# Patient Record
Sex: FEMALE | Race: WHITE | NOT HISPANIC OR LATINO | Employment: OTHER | URBAN - METROPOLITAN AREA
[De-identification: names, ages, dates, MRNs, and addresses within clinical notes are randomized per-mention and may not be internally consistent; named-entity substitution may affect disease eponyms.]

---

## 2017-01-03 RX ORDER — EZETIMIBE/SIMVASTATIN 10 MG-20MG
TABLET ORAL
Qty: 90 TABLET | Refills: 0 | Status: SHIPPED | OUTPATIENT
Start: 2017-01-03 | End: 2017-04-04 | Stop reason: SDUPTHER

## 2017-01-12 ENCOUNTER — OFFICE VISIT (OUTPATIENT)
Dept: GASTROENTEROLOGY | Facility: CLINIC | Age: 77
End: 2017-01-12

## 2017-01-12 VITALS
HEIGHT: 63 IN | DIASTOLIC BLOOD PRESSURE: 78 MMHG | WEIGHT: 167.4 LBS | SYSTOLIC BLOOD PRESSURE: 126 MMHG | BODY MASS INDEX: 29.66 KG/M2

## 2017-01-12 DIAGNOSIS — D50.8 OTHER IRON DEFICIENCY ANEMIA: ICD-10-CM

## 2017-01-12 DIAGNOSIS — D64.9 ANEMIA, UNSPECIFIED TYPE: Primary | ICD-10-CM

## 2017-01-12 PROCEDURE — 99203 OFFICE O/P NEW LOW 30 MIN: CPT | Performed by: INTERNAL MEDICINE

## 2017-01-12 NOTE — LETTER
January 12, 2017     Pipo Guillermo DO  [address] [facility] [address]    Patient: Rut Wright   YOB: 1940   Date of Visit: 1/12/2017       Dear Dr. Eagle DO:    Thank you for referring Rut Wright to me for evaluation. Below are the relevant portions of my assessment and plan of care.                   If you have questions, please do not hesitate to call me. I look forward to following Rut along with you.         Sincerely,        Araceli Miranda MD        CC: No Recipients

## 2017-01-12 NOTE — MR AVS SNAPSHOT
[facility] GASTROENTEROLOGY  [phone]                    Rut Wright   1/12/2017 10:45 AM   Office Visit    Dept Phone:  [phone]   Encounter #:  [id number]    Provider:  Araceli Miranda MD   Department:  [facility] GASTROENTEROLOGY                Your Full Care Plan              Today's Medication Changes          These changes are accurate as of: 1/12/17 11:24 AM.  If you have any questions, ask your nurse or doctor.               New Medication(s)Ordered:     polyethylene glycol 236 G solution   Commonly known as:  GoLYTELY   Drink 1/2 starting at 2:00pm the day prior. Drink remaining 1/2 at 10:00 pm. May add flavor packet.   Started by:  Araceli Miranda MD            Where to Get Your Medications      These medications were sent to [facility] [address] - [address] [phone] [facility] [phone]   [address] [address]     Phone:  [phone]     polyethylene glycol 236 G solution                  Your Updated Medication List          This list is accurate as of: 1/12/17 11:24 AM.  Always use your most recent med list.                Chlorcyclizine-Pseudoephed 25-60 MG tablet   Commonly known as:  STAHIST AD   1 po q 8 hours prn       coenzyme Q10 50 MG capsule capsule       etodolac 400 MG tablet   Commonly known as:  LODINE   TAKE ONE TABLET BY MOUTH DAILY AS NEEDED FOR PAIN       ferrous sulfate 325 (65 FE) MG tablet       fluticasone 50 MCG/ACT nasal spray   Commonly known as:  FLONASE       montelukast 10 MG tablet   Commonly known as:  SINGULAIR   TAKE ONE TABLET BY MOUTH EVERY NIGHT AT BEDTIME       MULTI VITAMIN DAILY PO       polyethylene glycol 236 G solution   Commonly known as:  GoLYTELY   Drink 1/2 starting at 2:00pm the day prior. Drink remaining 1/2 at 10:00 pm. May add flavor packet.       PROAIR  (90 BASE) MCG/ACT inhaler   Generic drug:  albuterol       venlafaxine 75 MG tablet   Commonly known as:   "EFFEXOR   Take 1 tablet by mouth daily.       vitamin D 18108 UNITS capsule capsule   Commonly known as:  ERGOCALCIFEROL       VYTORIN 10-20 MG per tablet   Generic drug:  ezetimibe-simvastatin   TAKE ONE TABLET BY MOUTH ONCE NIGHTLY               We Performed the Following     Case Request       You Were Diagnosed With        Codes Comments    Anemia, unspecified type    -  Primary ICD-10-CM: D64.9  ICD-9-CM: 285.9     Other iron deficiency anemia     ICD-10-CM: D50.8       Instructions     None    Patient Instructions History      Upcoming Appointments     Visit Type Date Time Department    NEW PATIENT 1/12/2017 10:45 AM [facility]      MyChart Signup     Our records indicate that you have declined [employer] signup. If you would like to sign up for [employer], please email [email or web address] or call [phone] to obtain an activation code.             Other Info from Your Visit           Allergies     Penicillins  Hives      Reason for Visit     Anemia           Vital Signs     Blood Pressure Height Weight Body Mass Index Smoking Status       126/78 62.5\" (158.8 cm) 167 lb 6.4 oz (75.9 kg) 30.13 kg/m2 Never Smoker       Problems and Diagnoses Noted     Anemia    -  Primary    Iron deficiency anemia            "

## 2017-01-12 NOTE — PROGRESS NOTES
"    PATIENT INFORMATION  Rut Wright       - 1940    CHIEF COMPLAINT  Chief Complaint   Patient presents with   • Anemia       HISTORY OF PRESENT ILLNESS  HPI    78 yo diagnosed with iron deficiency last month. She had labs at pcp for routine labs. Hemoglobin was noted to be 10 with iron of 15 and ferritin of 9.8. Hemoglobin in  was 13.9. She has been having issues with fatigue since 2016. Moderate in intensity. No blood in stool or melena. No weight loss. Last cls was 5-6 years ago and done in [place] for screening purposes. Normal per patient.  She routinely donates blood up until September last  when they denied her due to anemia.   She was donating twice yearly.  No family history. No heartburn or dysphagia or odynophagia. Weight has been stable. She occ. Has epigastric abdominal pain, sharp, brief, last only seconds and makes a \"quesy\" sensation. No radiation of the pain. This occurs 2-3 times weekly.  She takes etodolac daily for the past 6 years.  Iron started 2 weeks ago.  REVIEW OF SYSTEMS  Review of Systems   HENT: Positive for sinus pressure and voice change.    Genitourinary: Positive for frequency.   Neurological: Positive for headaches.   All other systems reviewed and are negative.        ACTIVE PROBLEMS  Patient Active Problem List    Diagnosis   • Hyperlipidemia [E78.5]   • Perennial allergic rhinitis [J30.89]   • Vitamin D deficiency [E55.9]   • Mixed anxiety depressive disorder [F41.8]   • Generalized osteoarthritis [M15.9]         PAST MEDICAL HISTORY  Past Medical History   Diagnosis Date   • Allergic    • Depression    • Hyperlipidemia          SURGICAL HISTORY  Past Surgical History   Procedure Laterality Date   • Tonsillectomy     • Hysterectomy           FAMILY HISTORY  Family History   Problem Relation Age of Onset   • Heart disease Mother    • Heart disease Father          SOCIAL HISTORY  Social History     Occupational History   • Not on file.     Social " "History Main Topics   • Smoking status: Never Smoker   • Smokeless tobacco: Not on file   • Alcohol use No   • Drug use: Not on file   • Sexual activity: Not on file         CURRENT MEDICATIONS    Current Outpatient Prescriptions:   •  albuterol (PROAIR HFA) 108 (90 BASE) MCG/ACT inhaler, ProAir  (90 Base) MCG/ACT Inhalation Aerosol Solution; Patient Sig: ProAir  (90 Base) MCG/ACT Inhalation Aerosol Solution INHALE TWO PUFFS BY MOUTH EVERY 4 HOURS AS NEEDED; 1; 2; 04-Jan-2016; Active, Disp: , Rfl:   •  Chlorcyclizine-Pseudoephed (STAHIST AD) 25-60 MG tablet, 1 po q 8 hours prn, Disp: 30 tablet, Rfl: 0  •  coenzyme Q10 50 MG capsule capsule, Take  by mouth Daily., Disp: , Rfl:   •  etodolac (LODINE) 400 MG tablet, TAKE ONE TABLET BY MOUTH DAILY AS NEEDED FOR PAIN, Disp: 90 tablet, Rfl: 0  •  ferrous sulfate 325 (65 FE) MG tablet, Take 325 mg by mouth Daily With Breakfast., Disp: , Rfl:   •  fluticasone (FLONASE) 50 MCG/ACT nasal spray, into each nostril., Disp: , Rfl:   •  montelukast (SINGULAIR) 10 MG tablet, TAKE ONE TABLET BY MOUTH EVERY NIGHT AT BEDTIME, Disp: 30 tablet, Rfl: 2  •  Multiple Vitamin (MULTI VITAMIN DAILY PO), Take  by mouth., Disp: , Rfl:   •  venlafaxine (EFFEXOR) 75 MG tablet, Take 1 tablet by mouth daily., Disp: 90 tablet, Rfl: 1  •  vitamin D (ERGOCALCIFEROL) 96808 UNITS capsule capsule, Take 50,000 Units by mouth 1 (One) Time Per Week., Disp: , Rfl:   •  VYTORIN 10-20 MG per tablet, TAKE ONE TABLET BY MOUTH ONCE NIGHTLY, Disp: 90 tablet, Rfl: 0    ALLERGIES  Penicillins    VITALS  Vitals:    01/12/17 1045   BP: 126/78   Weight: 167 lb 6.4 oz (75.9 kg)   Height: 62.5\" (158.8 cm)       LAST RESULTS   Office Visit on 12/20/2016   Component Date Value Ref Range Status   • WBC 12/20/2016 8.83  4.80 - 10.80 10*3/mm3 Final   • RBC 12/20/2016 4.62  4.20 - 5.40 10*6/mm3 Final   • Hemoglobin 12/20/2016 10.0* 12.0 - 16.0 g/dL Final   • Hematocrit 12/20/2016 32.8* 37.0 - 47.0 % Final   • MCV " 12/20/2016 71.0* 81.0 - 99.0 fL Final   • MCH 12/20/2016 21.6* 27.0 - 31.0 pg Final   • MCHC 12/20/2016 30.5* 31.0 - 37.0 g/dL Final   • RDW 12/20/2016 17.2* 11.5 - 14.5 % Final   • Platelets 12/20/2016 410  140 - 500 10*3/mm3 Final   • Neutrophil Rel % 12/20/2016 61.9  45.0 - 70.0 % Final   • Lymphocyte Rel % 12/20/2016 26.7  20.0 - 45.0 % Final   • Monocyte Rel % 12/20/2016 7.4  3.0 - 8.0 % Final   • Eosinophil Rel % 12/20/2016 3.3  0.0 - 4.0 % Final   • Basophil Rel % 12/20/2016 0.5  0.0 - 2.0 % Final   • Neutrophils Absolute 12/20/2016 5.47  1.50 - 8.30 10*3/mm3 Final   • Lymphocytes Absolute 12/20/2016 2.36  0.60 - 4.80 10*3/mm3 Final   • Monocytes Absolute 12/20/2016 0.65  0.00 - 1.00 10*3/mm3 Final   • Eosinophils Absolute 12/20/2016 0.29  0.10 - 0.30 10*3/mm3 Final   • Basophils Absolute 12/20/2016 0.04  0.00 - 0.20 10*3/mm3 Final   • Immature Granulocyte Rel % 12/20/2016 0.2  0.0 - 0.5 % Final   • Immature Grans Absolute 12/20/2016 0.02  0.00 - 0.03 10*3/mm3 Final   • nRBC 12/20/2016 0.0  0.0 - 0.0 /100 WBC Final   • Glucose 12/20/2016 96  65 - 99 mg/dL Final   • BUN 12/20/2016 13  8 - 23 mg/dL Final   • Creatinine 12/20/2016 0.95  0.57 - 1.00 mg/dL Final   • eGFR Non African Am 12/20/2016 57* >60 mL/min/1.73 Final    Comment: The MDRD GFR formula is only valid for adults with stable  renal function between ages 18 and 70.     • eGFR  Am 12/20/2016 70  >60 mL/min/1.73 Final   • BUN/Creatinine Ratio 12/20/2016 13.7  7.0 - 25.0 Final   • Sodium 12/20/2016 136  136 - 145 mmol/L Final   • Potassium 12/20/2016 4.8  3.5 - 5.2 mmol/L Final   • Chloride 12/20/2016 96* 98 - 107 mmol/L Final   • Total CO2 12/20/2016 24.9  22.0 - 29.0 mmol/L Final   • Calcium 12/20/2016 9.7  8.8 - 10.5 mg/dL Final   • Total Protein 12/20/2016 6.7  6.0 - 8.5 g/dL Final   • Albumin 12/20/2016 4.40  3.50 - 5.20 g/dL Final   • Globulin 12/20/2016 2.3  gm/dL Final   • A/G Ratio 12/20/2016 1.9  g/dL Final   • Total Bilirubin  12/20/2016 0.4  0.2 - 1.2 mg/dL Final   • Alkaline Phosphatase 12/20/2016 88  40 - 129 U/L Final   • AST (SGOT) 12/20/2016 21  5 - 32 U/L Final   • ALT (SGPT) 12/20/2016 20  5 - 33 U/L Final   • TSH 12/20/2016 2.440  0.270 - 4.200 mIU/mL Final   • TIBC 12/20/2016 424  261 - 478 mcg/dL Final   • UIBC 12/20/2016 409* 112 - 346 mcg/dL Final   • Iron 12/20/2016 15* 37 - 145 mcg/dL Final   • Iron Saturation 12/20/2016 4  % Final   • Ferritin 12/20/2016 9.86* 13.00 - 150.00 ng/mL Final   • Written Authorization 12/20/2016 Comment   Final    Comment: Written Authorization Received.  [facility]  Logged by Nida Blum       No results found.    PHYSICAL EXAM  Physical Exam   Constitutional: She is oriented to person, place, and time. She appears well-developed and well-nourished. No distress.   HENT:   Head: Normocephalic and atraumatic.   Mouth/Throat: Oropharynx is clear and moist.   Eyes: EOM are normal. Pupils are equal, round, and reactive to light.   Neck: Normal range of motion. No tracheal deviation present.   Cardiovascular: Normal rate, regular rhythm, normal heart sounds and intact distal pulses.  Exam reveals no gallop and no friction rub.    No murmur heard.  Pulmonary/Chest: Effort normal and breath sounds normal. No stridor. No respiratory distress. She has no wheezes. She has no rales. She exhibits no tenderness.   Abdominal: Soft. Bowel sounds are normal. She exhibits no distension. There is no tenderness. There is no rebound and no guarding.   Musculoskeletal: She exhibits no edema.   Lymphadenopathy:     She has no cervical adenopathy.   Neurological: She is alert and oriented to person, place, and time.   Skin: Skin is warm. She is not diaphoretic.   Psychiatric: She has a normal mood and affect. Her behavior is normal. Judgment and thought content normal.   Nursing note and vitals reviewed.      ASSESSMENT  Diagnoses and all orders for this visit:    Anemia,  unspecified type    Other iron deficiency anemia  -     Case Request; Standing  -     Case Request    Other orders  -     Implement Anesthesia orders day of procedure.; Standing  -     Obtain informed consent; Standing  -     Verify informed consent; Standing          PLAN  No Follow-up on file.      Risks, benefits and alternatives discussed including but not limited to the complications of bleeding, perforation and sedation related problems.      Stop etodolac

## 2017-01-16 RX ORDER — VENLAFAXINE 75 MG/1
TABLET ORAL
Qty: 90 TABLET | Refills: 0 | Status: SHIPPED | OUTPATIENT
Start: 2017-01-16 | End: 2017-04-18 | Stop reason: SDUPTHER

## 2017-01-19 ENCOUNTER — ANESTHESIA EVENT (OUTPATIENT)
Dept: PERIOP | Facility: HOSPITAL | Age: 77
End: 2017-01-19

## 2017-01-20 ENCOUNTER — HOSPITAL ENCOUNTER (OUTPATIENT)
Facility: HOSPITAL | Age: 77
Setting detail: HOSPITAL OUTPATIENT SURGERY
Discharge: HOME | End: 2017-01-20
Attending: INTERNAL MEDICINE | Admitting: INTERNAL MEDICINE

## 2017-01-20 ENCOUNTER — ANESTHESIA (OUTPATIENT)
Dept: PERIOP | Facility: HOSPITAL | Age: 77
End: 2017-01-20

## 2017-01-20 VITALS
OXYGEN SATURATION: 99 % | DIASTOLIC BLOOD PRESSURE: 64 MMHG | HEART RATE: 75 BPM | HEIGHT: 62 IN | TEMPERATURE: 98.1 F | SYSTOLIC BLOOD PRESSURE: 142 MMHG | RESPIRATION RATE: 16 BRPM

## 2017-01-20 DIAGNOSIS — D50.8 OTHER IRON DEFICIENCY ANEMIA: ICD-10-CM

## 2017-01-20 PROCEDURE — 25010000002 PROPOFOL 10 MG/ML EMULSION: Performed by: NURSE ANESTHETIST, CERTIFIED REGISTERED

## 2017-01-20 PROCEDURE — 43239 EGD BIOPSY SINGLE/MULTIPLE: CPT | Performed by: INTERNAL MEDICINE

## 2017-01-20 RX ORDER — SODIUM CHLORIDE 0.9 % (FLUSH) 0.9 %
1-10 SYRINGE (ML) INJECTION AS NEEDED
Status: DISCONTINUED | OUTPATIENT
Start: 2017-01-20 | End: 2017-01-20 | Stop reason: HOSPADM

## 2017-01-20 RX ORDER — SODIUM CHLORIDE, SODIUM LACTATE, POTASSIUM CHLORIDE, CALCIUM CHLORIDE 600; 310; 30; 20 MG/100ML; MG/100ML; MG/100ML; MG/100ML
9 INJECTION, SOLUTION INTRAVENOUS CONTINUOUS
Status: DISCONTINUED | OUTPATIENT
Start: 2017-01-20 | End: 2017-01-20 | Stop reason: HOSPADM

## 2017-01-20 RX ORDER — PROPOFOL 10 MG/ML
VIAL (ML) INTRAVENOUS CONTINUOUS PRN
Status: DISCONTINUED | OUTPATIENT
Start: 2017-01-20 | End: 2017-01-20 | Stop reason: SURG

## 2017-01-20 RX ORDER — LIDOCAINE HYDROCHLORIDE 10 MG/ML
0.3 INJECTION, SOLUTION EPIDURAL; INFILTRATION; INTRACAUDAL; PERINEURAL ONCE
Status: COMPLETED | OUTPATIENT
Start: 2017-01-20 | End: 2017-01-20

## 2017-01-20 RX ORDER — PROPOFOL 10 MG/ML
VIAL (ML) INTRAVENOUS AS NEEDED
Status: DISCONTINUED | OUTPATIENT
Start: 2017-01-20 | End: 2017-01-20 | Stop reason: SURG

## 2017-01-20 RX ORDER — GLYCOPYRROLATE 0.2 MG/ML
INJECTION INTRAMUSCULAR; INTRAVENOUS AS NEEDED
Status: DISCONTINUED | OUTPATIENT
Start: 2017-01-20 | End: 2017-01-20 | Stop reason: SURG

## 2017-01-20 RX ORDER — LIDOCAINE HYDROCHLORIDE 10 MG/ML
INJECTION, SOLUTION INFILTRATION; PERINEURAL AS NEEDED
Status: DISCONTINUED | OUTPATIENT
Start: 2017-01-20 | End: 2017-01-20 | Stop reason: SURG

## 2017-01-20 RX ADMIN — PROPOFOL 50 MG: 10 INJECTION, EMULSION INTRAVENOUS at 13:00

## 2017-01-20 RX ADMIN — PROPOFOL 100 MCG/KG/MIN: 10 INJECTION, EMULSION INTRAVENOUS at 13:00

## 2017-01-20 RX ADMIN — GLYCOPYRROLATE 0.1 MG: 0.2 INJECTION INTRAMUSCULAR; INTRAVENOUS at 12:55

## 2017-01-20 RX ADMIN — PROPOFOL 50 MG: 10 INJECTION, EMULSION INTRAVENOUS at 13:05

## 2017-01-20 RX ADMIN — LIDOCAINE HYDROCHLORIDE 50 MG: 10 INJECTION, SOLUTION INFILTRATION; PERINEURAL at 12:55

## 2017-01-20 RX ADMIN — PROPOFOL 30 MG: 10 INJECTION, EMULSION INTRAVENOUS at 13:10

## 2017-01-20 RX ADMIN — LIDOCAINE HYDROCHLORIDE 0.3 ML: 10 INJECTION, SOLUTION EPIDURAL; INFILTRATION; INTRACAUDAL; PERINEURAL at 12:20

## 2017-01-20 RX ADMIN — SODIUM CHLORIDE, POTASSIUM CHLORIDE, SODIUM LACTATE AND CALCIUM CHLORIDE: 600; 310; 30; 20 INJECTION, SOLUTION INTRAVENOUS at 12:55

## 2017-01-20 RX ADMIN — SODIUM CHLORIDE, POTASSIUM CHLORIDE, SODIUM LACTATE AND CALCIUM CHLORIDE 9 ML/HR: 600; 310; 30; 20 INJECTION, SOLUTION INTRAVENOUS at 12:20

## 2017-01-20 RX ADMIN — PROPOFOL 30 MG: 10 INJECTION, EMULSION INTRAVENOUS at 13:20

## 2017-01-20 NOTE — ANESTHESIA PREPROCEDURE EVALUATION
Anesthesia Evaluation     Patient summary reviewed and Nursing notes reviewed    No history of anesthetic complications   Airway   Mallampati: I  TM distance: >3 FB  Neck ROM: full  no difficulty expected  Dental    (+) upper dentures    Comment: Lower partial    Pulmonary - normal exam    breath sounds clear to auscultation    ROS comment: Allergies  Uses pro air inhaler as needed, no use for 2 mos  Cardiovascular - normal exam  Exercise tolerance: good (4-7 METS)  (+) hypertension (a little high blood pressure, no meds),     Rhythm: regular  Rate: normal    Neuro/Psych  (+) headaches (current), psychiatric history Anxiety and Depression,    Dizziness: 4 episodes last 15 years has felt nauseated and would pass out, resolves with lying down.  GI/Hepatic/Renal/Endo - negative ROS     Musculoskeletal     Abdominal  - normal exam   Substance History      OB/GYN negative ob/gyn ROS         Other   (+) blood dyscrasia (anemia, low iron, fatigue), arthritis (generalized osteoarthritis, hands)                          Anesthesia Plan    ASA 2     MAC     intravenous induction   Anesthetic plan and risks discussed with patient.

## 2017-01-20 NOTE — ANESTHESIA POSTPROCEDURE EVALUATION
Patient: Rut Wright    Procedure Summary     Date Anesthesia Start Anesthesia Stop Room / Location    01/20/17 1255 1341 [facility] / [facility] OR       Procedure Diagnosis Surgeon Provider    ESOPHAGOGASTRODUODENOSCOPY WITH BIOPSY (N/A Esophagus); COLONOSCOPY (N/A ) Other iron deficiency anemia  (Other iron deficiency anemia [D50.8]) MD Tod Blancas CRNA          Anesthesia Type: MAC  Last vitals  /67 (01/20/17 1354)    Temp 98.1 °F (36.7 °C) (01/20/17 1344)    Pulse 83 (01/20/17 1354)   Resp 16 (01/20/17 1354)    SpO2 99 % (01/20/17 1354)      Post Anesthesia Care and Evaluation    Patient location during evaluation: bedside  Patient participation: complete - patient participated  Level of consciousness: awake and alert  Pain score: 0  Pain management: adequate  Airway patency: patent  Anesthetic complications: No anesthetic complications    Cardiovascular status: acceptable  Respiratory status: acceptable  Hydration status: acceptable

## 2017-01-24 LAB
LAB AP CASE REPORT: NORMAL
Lab: NORMAL
PATH REPORT.FINAL DX SPEC: NORMAL

## 2017-02-14 RX ORDER — MONTELUKAST SODIUM 10 MG/1
TABLET ORAL
Qty: 30 TABLET | Refills: 1 | Status: SHIPPED | OUTPATIENT
Start: 2017-02-14 | End: 2017-04-22 | Stop reason: SDUPTHER

## 2017-03-20 RX ORDER — ETODOLAC 400 MG/1
TABLET, FILM COATED ORAL
Qty: 90 TABLET | Refills: 0 | Status: SHIPPED | OUTPATIENT
Start: 2017-03-20 | End: 2017-06-17 | Stop reason: SDUPTHER

## 2017-04-04 RX ORDER — EZETIMIBE/SIMVASTATIN 10 MG-20MG
TABLET ORAL
Qty: 90 TABLET | Refills: 0 | Status: SHIPPED | OUTPATIENT
Start: 2017-04-04 | End: 2017-07-01 | Stop reason: SDUPTHER

## 2017-04-14 RX ORDER — VENLAFAXINE 75 MG/1
TABLET ORAL
Qty: 90 TABLET | Refills: 0 | OUTPATIENT
Start: 2017-04-14

## 2017-04-18 RX ORDER — VENLAFAXINE 75 MG/1
75 TABLET ORAL DAILY
Qty: 30 TABLET | Refills: 0 | Status: SHIPPED | OUTPATIENT
Start: 2017-04-18 | End: 2017-05-17 | Stop reason: SDUPTHER

## 2017-04-24 RX ORDER — MONTELUKAST SODIUM 10 MG/1
TABLET ORAL
Qty: 30 TABLET | Refills: 0 | Status: SHIPPED | OUTPATIENT
Start: 2017-04-24 | End: 2017-05-23 | Stop reason: SDUPTHER

## 2017-05-10 ENCOUNTER — HOSPITAL ENCOUNTER (OUTPATIENT)
Dept: GENERAL RADIOLOGY | Facility: HOSPITAL | Age: 77
Discharge: HOME | End: 2017-05-10
Admitting: FAMILY MEDICINE

## 2017-05-10 ENCOUNTER — HOSPITAL ENCOUNTER (OUTPATIENT)
Dept: GENERAL RADIOLOGY | Facility: HOSPITAL | Age: 77
Discharge: HOME | End: 2017-05-10

## 2017-05-10 ENCOUNTER — OFFICE VISIT (OUTPATIENT)
Dept: FAMILY MEDICINE CLINIC | Facility: CLINIC | Age: 77
End: 2017-05-10

## 2017-05-10 ENCOUNTER — TRANSCRIBE ORDERS (OUTPATIENT)
Dept: FAMILY MEDICINE CLINIC | Facility: CLINIC | Age: 77
End: 2017-05-10

## 2017-05-10 VITALS
BODY MASS INDEX: 29.81 KG/M2 | TEMPERATURE: 98.2 F | OXYGEN SATURATION: 97 % | SYSTOLIC BLOOD PRESSURE: 120 MMHG | HEIGHT: 62 IN | HEART RATE: 105 BPM | WEIGHT: 162 LBS | DIASTOLIC BLOOD PRESSURE: 80 MMHG

## 2017-05-10 DIAGNOSIS — M19.041 ARTHRITIS OF RIGHT HAND: ICD-10-CM

## 2017-05-10 DIAGNOSIS — M79.641 HAND PAIN, RIGHT: ICD-10-CM

## 2017-05-10 DIAGNOSIS — M21.611 BUNION, RIGHT: ICD-10-CM

## 2017-05-10 DIAGNOSIS — M21.611 BUNION, RIGHT: Primary | ICD-10-CM

## 2017-05-10 DIAGNOSIS — M79.641 HAND PAIN, RIGHT: Primary | ICD-10-CM

## 2017-05-10 PROCEDURE — 73630 X-RAY EXAM OF FOOT: CPT

## 2017-05-10 PROCEDURE — 99213 OFFICE O/P EST LOW 20 MIN: CPT | Performed by: FAMILY MEDICINE

## 2017-05-10 PROCEDURE — 73120 X-RAY EXAM OF HAND: CPT

## 2017-05-17 RX ORDER — VENLAFAXINE 75 MG/1
TABLET ORAL
Qty: 30 TABLET | Refills: 0 | Status: SHIPPED | OUTPATIENT
Start: 2017-05-17 | End: 2017-06-13 | Stop reason: SDUPTHER

## 2017-05-23 RX ORDER — MONTELUKAST SODIUM 10 MG/1
TABLET ORAL
Qty: 30 TABLET | Refills: 0 | Status: SHIPPED | OUTPATIENT
Start: 2017-05-23 | End: 2017-06-17 | Stop reason: SDUPTHER

## 2017-06-13 RX ORDER — VENLAFAXINE 75 MG/1
TABLET ORAL
Qty: 30 TABLET | Refills: 0 | Status: SHIPPED | OUTPATIENT
Start: 2017-06-13 | End: 2017-07-11 | Stop reason: SDUPTHER

## 2017-06-19 RX ORDER — ETODOLAC 400 MG/1
TABLET, FILM COATED ORAL
Qty: 90 TABLET | Refills: 0 | Status: SHIPPED | OUTPATIENT
Start: 2017-06-19 | End: 2017-09-18 | Stop reason: SDUPTHER

## 2017-06-19 RX ORDER — MONTELUKAST SODIUM 10 MG/1
TABLET ORAL
Qty: 30 TABLET | Refills: 0 | Status: SHIPPED | OUTPATIENT
Start: 2017-06-19 | End: 2017-07-24 | Stop reason: SDUPTHER

## 2017-07-03 RX ORDER — EZETIMIBE/SIMVASTATIN 10 MG-20MG
TABLET ORAL
Qty: 90 TABLET | Refills: 0 | Status: SHIPPED | OUTPATIENT
Start: 2017-07-03 | End: 2017-09-27 | Stop reason: SDUPTHER

## 2017-07-11 RX ORDER — VENLAFAXINE 75 MG/1
TABLET ORAL
Qty: 30 TABLET | Refills: 3 | Status: SHIPPED | OUTPATIENT
Start: 2017-07-11 | End: 2017-11-14 | Stop reason: SDUPTHER

## 2017-07-25 RX ORDER — MONTELUKAST SODIUM 10 MG/1
TABLET ORAL
Qty: 30 TABLET | Refills: 5 | Status: SHIPPED | OUTPATIENT
Start: 2017-07-25 | End: 2018-01-26 | Stop reason: SDUPTHER

## 2017-09-19 RX ORDER — ETODOLAC 400 MG/1
TABLET, FILM COATED ORAL
Qty: 90 TABLET | Refills: 0 | Status: SHIPPED | OUTPATIENT
Start: 2017-09-19 | End: 2017-12-21 | Stop reason: SDUPTHER

## 2017-09-27 RX ORDER — EZETIMIBE AND SIMVASTATIN 10; 20 MG/1; MG/1
TABLET ORAL
Qty: 90 TABLET | Refills: 0 | Status: SHIPPED | OUTPATIENT
Start: 2017-09-27 | End: 2017-12-23 | Stop reason: SDUPTHER

## 2017-10-10 RX ORDER — FLUTICASONE PROPIONATE 50 MCG
SPRAY, SUSPENSION (ML) NASAL
Qty: 16 G | Refills: 1 | Status: SHIPPED | OUTPATIENT
Start: 2017-10-10 | End: 2017-12-08 | Stop reason: SDUPTHER

## 2017-11-14 RX ORDER — VENLAFAXINE 75 MG/1
75 TABLET ORAL DAILY
Qty: 30 TABLET | Refills: 0 | Status: SHIPPED | OUTPATIENT
Start: 2017-11-14 | End: 2017-12-12 | Stop reason: SDUPTHER

## 2017-11-15 ENCOUNTER — TELEPHONE (OUTPATIENT)
Dept: FAMILY MEDICINE CLINIC | Facility: CLINIC | Age: 77
End: 2017-11-15

## 2017-11-15 ENCOUNTER — CLINICAL SUPPORT (OUTPATIENT)
Dept: FAMILY MEDICINE CLINIC | Facility: CLINIC | Age: 77
End: 2017-11-15

## 2017-11-15 VITALS — HEART RATE: 89 BPM | DIASTOLIC BLOOD PRESSURE: 74 MMHG | SYSTOLIC BLOOD PRESSURE: 160 MMHG

## 2017-12-08 RX ORDER — FLUTICASONE PROPIONATE 50 MCG
SPRAY, SUSPENSION (ML) NASAL
Qty: 15.8 ML | Refills: 0 | Status: SHIPPED | OUTPATIENT
Start: 2017-12-08 | End: 2018-01-04 | Stop reason: SDUPTHER

## 2017-12-12 RX ORDER — VENLAFAXINE 75 MG/1
75 TABLET ORAL DAILY
Qty: 30 TABLET | Refills: 0 | Status: SHIPPED | OUTPATIENT
Start: 2017-12-12 | End: 2018-01-11 | Stop reason: SDUPTHER

## 2017-12-21 RX ORDER — ETODOLAC 400 MG/1
TABLET, FILM COATED ORAL
Qty: 90 TABLET | Refills: 0 | Status: SHIPPED | OUTPATIENT
Start: 2017-12-21 | End: 2019-05-21 | Stop reason: SDUPTHER

## 2017-12-22 RX ORDER — ETODOLAC 400 MG/1
TABLET, FILM COATED ORAL
Qty: 90 TABLET | Refills: 0 | Status: SHIPPED | OUTPATIENT
Start: 2017-12-22 | End: 2018-06-25 | Stop reason: SDUPTHER

## 2017-12-26 RX ORDER — EZETIMIBE AND SIMVASTATIN 10; 20 MG/1; MG/1
TABLET ORAL
Qty: 90 TABLET | Refills: 0 | Status: SHIPPED | OUTPATIENT
Start: 2017-12-26 | End: 2018-03-26 | Stop reason: SDUPTHER

## 2018-01-04 RX ORDER — FLUTICASONE PROPIONATE 50 MCG
SPRAY, SUSPENSION (ML) NASAL
Qty: 16 ML | Refills: 0 | Status: SHIPPED | OUTPATIENT
Start: 2018-01-04 | End: 2018-02-04 | Stop reason: SDUPTHER

## 2018-01-11 RX ORDER — VENLAFAXINE 75 MG/1
75 TABLET ORAL DAILY
Qty: 30 TABLET | Refills: 0 | Status: SHIPPED | OUTPATIENT
Start: 2018-01-11 | End: 2018-02-06 | Stop reason: SDUPTHER

## 2018-01-29 RX ORDER — MONTELUKAST SODIUM 10 MG/1
10 TABLET ORAL NIGHTLY
Qty: 30 TABLET | Refills: 0 | Status: SHIPPED | OUTPATIENT
Start: 2018-01-29 | End: 2018-02-26 | Stop reason: SDUPTHER

## 2018-02-05 RX ORDER — FLUTICASONE PROPIONATE 50 MCG
2 SPRAY, SUSPENSION (ML) NASAL DAILY
Qty: 1 BOTTLE | Refills: 0 | Status: SHIPPED | OUTPATIENT
Start: 2018-02-05 | End: 2018-03-04 | Stop reason: SDUPTHER

## 2018-02-06 RX ORDER — VENLAFAXINE 75 MG/1
TABLET ORAL
Qty: 30 TABLET | Refills: 0 | Status: SHIPPED | OUTPATIENT
Start: 2018-02-06 | End: 2018-03-04 | Stop reason: SDUPTHER

## 2018-02-26 RX ORDER — MONTELUKAST SODIUM 10 MG/1
TABLET ORAL
Qty: 30 TABLET | Refills: 0 | Status: SHIPPED | OUTPATIENT
Start: 2018-02-26 | End: 2018-04-02 | Stop reason: SDUPTHER

## 2018-03-05 RX ORDER — FLUTICASONE PROPIONATE 50 MCG
SPRAY, SUSPENSION (ML) NASAL
Qty: 16 G | Refills: 0 | Status: SHIPPED | OUTPATIENT
Start: 2018-03-05 | End: 2020-08-05

## 2018-03-05 RX ORDER — VENLAFAXINE 75 MG/1
TABLET ORAL
Qty: 30 TABLET | Refills: 0 | Status: SHIPPED | OUTPATIENT
Start: 2018-03-05 | End: 2018-03-26 | Stop reason: SDUPTHER

## 2018-03-26 RX ORDER — EZETIMIBE AND SIMVASTATIN 10; 20 MG/1; MG/1
TABLET ORAL
Qty: 90 TABLET | Refills: 0 | Status: SHIPPED | OUTPATIENT
Start: 2018-03-26 | End: 2018-06-26 | Stop reason: SDUPTHER

## 2018-03-26 RX ORDER — VENLAFAXINE 75 MG/1
75 TABLET ORAL DAILY
Qty: 30 TABLET | Refills: 0 | Status: SHIPPED | OUTPATIENT
Start: 2018-03-26 | End: 2020-04-13 | Stop reason: SDUPTHER

## 2018-04-02 RX ORDER — VENLAFAXINE 75 MG/1
TABLET ORAL
Qty: 30 TABLET | Refills: 0 | Status: SHIPPED | OUTPATIENT
Start: 2018-04-02 | End: 2018-07-05 | Stop reason: SDUPTHER

## 2018-04-02 RX ORDER — MONTELUKAST SODIUM 10 MG/1
TABLET ORAL
Qty: 30 TABLET | Refills: 0 | Status: SHIPPED | OUTPATIENT
Start: 2018-04-02 | End: 2018-04-29 | Stop reason: SDUPTHER

## 2018-04-10 DIAGNOSIS — R53.83 OTHER FATIGUE: ICD-10-CM

## 2018-04-10 DIAGNOSIS — E78.2 MIXED HYPERLIPIDEMIA: Primary | ICD-10-CM

## 2018-04-10 DIAGNOSIS — E55.9 VITAMIN D DEFICIENCY: ICD-10-CM

## 2018-04-11 LAB
25(OH)D3+25(OH)D2 SERPL-MCNC: >60 NG/ML
ALBUMIN SERPL-MCNC: 4.4 G/DL (ref 3.5–5.2)
ALBUMIN/GLOB SERPL: 1.7 G/DL
ALP SERPL-CCNC: 85 U/L (ref 40–129)
ALT SERPL-CCNC: 22 U/L (ref 5–33)
AST SERPL-CCNC: 20 U/L (ref 5–32)
BASOPHILS # BLD AUTO: 0.08 10*3/MM3 (ref 0–0.2)
BASOPHILS NFR BLD AUTO: 1.1 % (ref 0–2)
BILIRUB SERPL-MCNC: 0.5 MG/DL (ref 0.2–1.2)
BUN SERPL-MCNC: 11 MG/DL (ref 8–23)
BUN/CREAT SERPL: 14.1 (ref 7–25)
CALCIUM SERPL-MCNC: 9.9 MG/DL (ref 8.8–10.5)
CHLORIDE SERPL-SCNC: 99 MMOL/L (ref 98–107)
CHOLEST SERPL-MCNC: 170 MG/DL (ref 0–200)
CO2 SERPL-SCNC: 29.5 MMOL/L (ref 22–29)
CREAT SERPL-MCNC: 0.78 MG/DL (ref 0.57–1)
EOSINOPHIL # BLD AUTO: 0.62 10*3/MM3 (ref 0.1–0.3)
EOSINOPHIL NFR BLD AUTO: 8.9 % (ref 0–4)
ERYTHROCYTE [DISTWIDTH] IN BLOOD BY AUTOMATED COUNT: 13.2 % (ref 11.5–14.5)
GFR SERPLBLD CREATININE-BSD FMLA CKD-EPI: 72 ML/MIN/1.73
GFR SERPLBLD CREATININE-BSD FMLA CKD-EPI: 87 ML/MIN/1.73
GLOBULIN SER CALC-MCNC: 2.6 GM/DL
GLUCOSE SERPL-MCNC: 92 MG/DL (ref 65–99)
HCT VFR BLD AUTO: 46.2 % (ref 37–47)
HDLC SERPL-MCNC: 75 MG/DL (ref 40–60)
HGB BLD-MCNC: 15.3 G/DL (ref 12–16)
IMM GRANULOCYTES # BLD: 0.01 10*3/MM3 (ref 0–0.03)
IMM GRANULOCYTES NFR BLD: 0.1 % (ref 0–0.5)
LDLC SERPL CALC-MCNC: 61 MG/DL (ref 0–100)
LYMPHOCYTES # BLD AUTO: 2.27 10*3/MM3 (ref 0.6–4.8)
LYMPHOCYTES NFR BLD AUTO: 32.6 % (ref 20–45)
MCH RBC QN AUTO: 29.7 PG (ref 27–31)
MCHC RBC AUTO-ENTMCNC: 33.1 G/DL (ref 31–37)
MCV RBC AUTO: 89.5 FL (ref 81–99)
MONOCYTES # BLD AUTO: 0.65 10*3/MM3 (ref 0–1)
MONOCYTES NFR BLD AUTO: 9.3 % (ref 3–8)
NEUTROPHILS # BLD AUTO: 3.34 10*3/MM3 (ref 1.5–8.3)
NEUTROPHILS NFR BLD AUTO: 48 % (ref 45–70)
NRBC BLD AUTO-RTO: 0 /100 WBC (ref 0–0)
PLATELET # BLD AUTO: 283 10*3/MM3 (ref 140–500)
POTASSIUM SERPL-SCNC: 4.4 MMOL/L (ref 3.5–5.2)
PROT SERPL-MCNC: 7 G/DL (ref 6–8.5)
RBC # BLD AUTO: 5.16 10*6/MM3 (ref 4.2–5.4)
SODIUM SERPL-SCNC: 138 MMOL/L (ref 136–145)
TRIGL SERPL-MCNC: 171 MG/DL (ref 0–150)
TSH SERPL DL<=0.005 MIU/L-ACNC: 3.3 MIU/ML (ref 0.27–4.2)
VLDLC SERPL CALC-MCNC: 34.2 MG/DL (ref 7–27)
WBC # BLD AUTO: 6.97 10*3/MM3 (ref 4.8–10.8)

## 2018-04-18 ENCOUNTER — OFFICE VISIT (OUTPATIENT)
Dept: FAMILY MEDICINE CLINIC | Facility: CLINIC | Age: 78
End: 2018-04-18

## 2018-04-18 VITALS
TEMPERATURE: 98.3 F | SYSTOLIC BLOOD PRESSURE: 150 MMHG | DIASTOLIC BLOOD PRESSURE: 82 MMHG | WEIGHT: 160 LBS | HEIGHT: 62 IN | RESPIRATION RATE: 14 BRPM | BODY MASS INDEX: 29.44 KG/M2 | OXYGEN SATURATION: 95 % | HEART RATE: 102 BPM

## 2018-04-18 DIAGNOSIS — J30.89 PERENNIAL ALLERGIC RHINITIS: ICD-10-CM

## 2018-04-18 DIAGNOSIS — E55.9 VITAMIN D DEFICIENCY: ICD-10-CM

## 2018-04-18 DIAGNOSIS — Z17.0 MALIGNANT NEOPLASM OF NIPPLE OF LEFT BREAST IN FEMALE, ESTROGEN RECEPTOR POSITIVE (HCC): ICD-10-CM

## 2018-04-18 DIAGNOSIS — F41.8 MIXED ANXIETY DEPRESSIVE DISORDER: ICD-10-CM

## 2018-04-18 DIAGNOSIS — C50.012 MALIGNANT NEOPLASM OF NIPPLE OF LEFT BREAST IN FEMALE, ESTROGEN RECEPTOR POSITIVE (HCC): ICD-10-CM

## 2018-04-18 DIAGNOSIS — E78.2 MIXED HYPERLIPIDEMIA: Primary | ICD-10-CM

## 2018-04-18 PROCEDURE — 99213 OFFICE O/P EST LOW 20 MIN: CPT | Performed by: FAMILY MEDICINE

## 2018-04-19 PROBLEM — C50.012 MALIGNANT NEOPLASM OF NIPPLE OF LEFT BREAST IN FEMALE, ESTROGEN RECEPTOR POSITIVE: Status: ACTIVE | Noted: 2018-04-19

## 2018-04-19 PROBLEM — Z17.0 MALIGNANT NEOPLASM OF NIPPLE OF LEFT BREAST IN FEMALE, ESTROGEN RECEPTOR POSITIVE (HCC): Status: ACTIVE | Noted: 2018-04-19

## 2018-04-19 RX ORDER — ANASTROZOLE 1 MG/1
1 TABLET ORAL DAILY
Qty: 30 TABLET | Refills: 12
Start: 2018-04-19 | End: 2019-05-21

## 2018-04-19 NOTE — PROGRESS NOTES
Subjective   Rut Wright is a 78 y.o. female with   Chief Complaint   Patient presents with   • Hyperlipidemia   • Depression   • Vitamin D Deficiency   • Anxiety   .    History of Present Illness   78-year-old white female with history of hyperlipidemia, vitamin D deficiency as well as depression with anxiety features here in follow-up.  Medications include vitamin D to 50,000 international units per week.  Other medications include Effexor X are 75 mg daily, Singulair 10 mg daily, Flonase nasal spray used on a seasonal basis, Vytorin-10/20 one daily.  Patient has also on a seasonal basis used a has stayed the pro-air HFA.  She is recovering from left modified radical mastectomy.  She has done well and there is no evidence of  metastatic disease.  The following portions of the patient's history were reviewed and updated as appropriate: allergies, current medications, past family history, past medical history, past social history, past surgical history and problem list.    Review of Systems   Respiratory: Positive for shortness of breath and wheezing.         Breast cancer   Cardiovascular:        Hyperlipidemia   Allergic/Immunologic: Positive for environmental allergies.   Psychiatric/Behavioral: Positive for dysphoric mood. Negative for self-injury, sleep disturbance and suicidal ideas. The patient is nervous/anxious.        Objective     Vitals:    04/18/18 1045   BP: 150/82   Pulse: 102   Resp: 14   Temp: 98.3 °F (36.8 °C)   SpO2: 95%       No results found for this or any previous visit (from the past 168 hour(s)).    Physical Exam   Constitutional: She is oriented to person, place, and time. She appears well-developed and well-nourished.   HENT:   Head: Normocephalic and atraumatic.   Neck: Trachea normal and phonation normal. Neck supple. Normal carotid pulses present. Carotid bruit is not present. No thyroid mass and no thyromegaly present.   Cardiovascular: Normal rate, regular rhythm and normal heart  sounds.  Exam reveals no gallop and no friction rub.    No murmur heard.  Pulmonary/Chest: Effort normal and breath sounds normal. No respiratory distress. She has no decreased breath sounds. She has no wheezes. She has no rhonchi. She has no rales.   Lymphadenopathy:     She has no cervical adenopathy.   Neurological: She is alert and oriented to person, place, and time.   Skin: Skin is warm and dry. No rash noted.   Psychiatric: She has a normal mood and affect. Her speech is normal and behavior is normal. Judgment and thought content normal. Cognition and memory are normal.   Nursing note and vitals reviewed.    Orders Only on 04/10/2018   Component Date Value Ref Range Status   • WBC 04/11/2018 6.97  4.80 - 10.80 10*3/mm3 Final   • RBC 04/11/2018 5.16  4.20 - 5.40 10*6/mm3 Final   • Hemoglobin 04/11/2018 15.3  12.0 - 16.0 g/dL Final   • Hematocrit 04/11/2018 46.2  37.0 - 47.0 % Final   • MCV 04/11/2018 89.5  81.0 - 99.0 fL Final   • MCH 04/11/2018 29.7  27.0 - 31.0 pg Final   • MCHC 04/11/2018 33.1  31.0 - 37.0 g/dL Final   • RDW 04/11/2018 13.2  11.5 - 14.5 % Final   • Platelets 04/11/2018 283  140 - 500 10*3/mm3 Final   • Neutrophil Rel % 04/11/2018 48.0  45.0 - 70.0 % Final   • Lymphocyte Rel % 04/11/2018 32.6  20.0 - 45.0 % Final   • Monocyte Rel % 04/11/2018 9.3* 3.0 - 8.0 % Final   • Eosinophil Rel % 04/11/2018 8.9* 0.0 - 4.0 % Final   • Basophil Rel % 04/11/2018 1.1  0.0 - 2.0 % Final   • Neutrophils Absolute 04/11/2018 3.34  1.50 - 8.30 10*3/mm3 Final   • Lymphocytes Absolute 04/11/2018 2.27  0.60 - 4.80 10*3/mm3 Final   • Monocytes Absolute 04/11/2018 0.65  0.00 - 1.00 10*3/mm3 Final   • Eosinophils Absolute 04/11/2018 0.62* 0.10 - 0.30 10*3/mm3 Final   • Basophils Absolute 04/11/2018 0.08  0.00 - 0.20 10*3/mm3 Final   • Immature Granulocyte Rel % 04/11/2018 0.1  0.0 - 0.5 % Final   • Immature Grans Absolute 04/11/2018 0.01  0.00 - 0.03 10*3/mm3 Final   • nRBC 04/11/2018 0.0  0.0 - 0.0 /100 WBC Final    • Glucose 04/11/2018 92  65 - 99 mg/dL Final   • BUN 04/11/2018 11  8 - 23 mg/dL Final   • Creatinine 04/11/2018 0.78  0.57 - 1.00 mg/dL Final   • eGFR Non African Am 04/11/2018 72  >60 mL/min/1.73 Final    Comment: The MDRD GFR formula is only valid for adults with stable  renal function between ages 18 and 70.     • eGFR  Am 04/11/2018 87  >60 mL/min/1.73 Final   • BUN/Creatinine Ratio 04/11/2018 14.1  7.0 - 25.0 Final   • Sodium 04/11/2018 138  136 - 145 mmol/L Final   • Potassium 04/11/2018 4.4  3.5 - 5.2 mmol/L Final   • Chloride 04/11/2018 99  98 - 107 mmol/L Final   • Total CO2 04/11/2018 29.5* 22.0 - 29.0 mmol/L Final   • Calcium 04/11/2018 9.9  8.8 - 10.5 mg/dL Final   • Total Protein 04/11/2018 7.0  6.0 - 8.5 g/dL Final   • Albumin 04/11/2018 4.40  3.50 - 5.20 g/dL Final   • Globulin 04/11/2018 2.6  gm/dL Final   • A/G Ratio 04/11/2018 1.7  g/dL Final   • Total Bilirubin 04/11/2018 0.5  0.2 - 1.2 mg/dL Final   • Alkaline Phosphatase 04/11/2018 85  40 - 129 U/L Final   • AST (SGOT) 04/11/2018 20  5 - 32 U/L Final   • ALT (SGPT) 04/11/2018 22  5 - 33 U/L Final   • Total Cholesterol 04/11/2018 170  0 - 200 mg/dL Final   • Triglycerides 04/11/2018 171* 0 - 150 mg/dL Final   • HDL Cholesterol 04/11/2018 75* 40 - 60 mg/dL Final   • VLDL Cholesterol 04/11/2018 34.2* 7 - 27 mg/dL Final   • LDL Cholesterol  04/11/2018 61  0 - 100 mg/dL Final   • 25 Hydroxy, Vitamin D 04/11/2018 >60.0  ng/ml Final    Comment: Reference Range for Total Vitamin D 25(OH)  Deficiency    <20.0 ng/mL  Insufficiency 21-29 ng/mL  Sufficiency   30-74 ng/mL     • TSH 04/11/2018 3.300  0.270 - 4.200 mIU/mL Final         Assessment/Plan   Rut was seen today for hyperlipidemia, depression, vitamin d deficiency and anxiety.    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        Return in about 6 months (around 10/18/2018) for Recheck.

## 2018-04-30 RX ORDER — MONTELUKAST SODIUM 10 MG/1
TABLET ORAL
Qty: 30 TABLET | Refills: 0 | Status: SHIPPED | OUTPATIENT
Start: 2018-04-30 | End: 2018-05-31 | Stop reason: SDUPTHER

## 2018-06-01 RX ORDER — MONTELUKAST SODIUM 10 MG/1
TABLET ORAL
Qty: 30 TABLET | Refills: 5 | Status: SHIPPED | OUTPATIENT
Start: 2018-06-01 | End: 2018-12-03 | Stop reason: SDUPTHER

## 2018-06-25 RX ORDER — ETODOLAC 400 MG/1
TABLET, FILM COATED ORAL
Qty: 90 TABLET | Refills: 0 | Status: SHIPPED | OUTPATIENT
Start: 2018-06-25 | End: 2018-09-25 | Stop reason: SDUPTHER

## 2018-06-26 RX ORDER — EZETIMIBE AND SIMVASTATIN 10; 20 MG/1; MG/1
TABLET ORAL
Qty: 90 TABLET | Refills: 0 | Status: SHIPPED | OUTPATIENT
Start: 2018-06-26 | End: 2018-09-20 | Stop reason: SDUPTHER

## 2018-07-05 RX ORDER — VENLAFAXINE 75 MG/1
TABLET ORAL
Qty: 30 TABLET | Refills: 0 | Status: SHIPPED | OUTPATIENT
Start: 2018-07-05 | End: 2018-08-06 | Stop reason: SDUPTHER

## 2018-08-06 RX ORDER — VENLAFAXINE 75 MG/1
TABLET ORAL
Qty: 30 TABLET | Refills: 0 | Status: SHIPPED | OUTPATIENT
Start: 2018-08-06 | End: 2018-09-02 | Stop reason: SDUPTHER

## 2018-09-04 RX ORDER — VENLAFAXINE 75 MG/1
TABLET ORAL
Qty: 30 TABLET | Refills: 2 | Status: SHIPPED | OUTPATIENT
Start: 2018-09-04 | End: 2018-10-21 | Stop reason: SDUPTHER

## 2018-09-20 RX ORDER — EZETIMIBE AND SIMVASTATIN 10; 20 MG/1; MG/1
TABLET ORAL
Qty: 90 TABLET | Refills: 0 | Status: SHIPPED | OUTPATIENT
Start: 2018-09-20 | End: 2018-12-16 | Stop reason: SDUPTHER

## 2018-09-25 RX ORDER — ETODOLAC 400 MG/1
TABLET, FILM COATED ORAL
Qty: 90 TABLET | Refills: 0 | Status: SHIPPED | OUTPATIENT
Start: 2018-09-25 | End: 2018-10-21 | Stop reason: SDUPTHER

## 2018-10-18 ENCOUNTER — OFFICE VISIT (OUTPATIENT)
Dept: FAMILY MEDICINE CLINIC | Facility: CLINIC | Age: 78
End: 2018-10-18

## 2018-10-18 VITALS
SYSTOLIC BLOOD PRESSURE: 122 MMHG | BODY MASS INDEX: 29.44 KG/M2 | RESPIRATION RATE: 18 BRPM | HEIGHT: 62 IN | WEIGHT: 160 LBS | DIASTOLIC BLOOD PRESSURE: 80 MMHG | TEMPERATURE: 98.5 F | OXYGEN SATURATION: 96 % | HEART RATE: 78 BPM

## 2018-10-18 DIAGNOSIS — J45.41 MODERATE PERSISTENT ASTHMA WITH ACUTE EXACERBATION: Primary | ICD-10-CM

## 2018-10-18 PROCEDURE — 99213 OFFICE O/P EST LOW 20 MIN: CPT | Performed by: FAMILY MEDICINE

## 2018-10-18 NOTE — PROGRESS NOTES
Subjective   Rut Wright is a 78 y.o. female with   Chief Complaint   Patient presents with   • Follow-up     on asthma   .    History of Present Illness     Pt is a 79 yo white female who presents to follow up on Asthma symptoms which have been exacerbated recently.  Pt complains of shortness of air and headache today.  She states her symptoms are always worse this time of the year.  Pt is currently prescribed Proair HFA, Singulair and Flonase for her symptoms.    The following portions of the patient's history were reviewed and updated as appropriate: allergies, current medications, past family history, past medical history, past social history, past surgical history and problem list.    Review of Systems   Respiratory: Positive for cough and shortness of breath.         ASTHMA   Neurological: Positive for headaches.   All other systems reviewed and are negative.      Objective     Vitals:    10/18/18 1003   BP: 122/80   Pulse: 78   Resp: 18   Temp: 98.5 °F (36.9 °C)   SpO2: 96%     BP Readings from Last 3 Encounters:   10/18/18 122/80   04/18/18 150/82   11/15/17 160/74      Wt Readings from Last 3 Encounters:   10/18/18 72.6 kg (160 lb)   04/18/18 72.6 kg (160 lb)   05/10/17 73.5 kg (162 lb)        No results found for this or any previous visit (from the past 168 hour(s)).    Physical Exam   Constitutional: She is oriented to person, place, and time. She appears well-developed and well-nourished.   HENT:   Head: Normocephalic and atraumatic.   Neck: Trachea normal and phonation normal. Neck supple. Normal carotid pulses present. Carotid bruit is not present. No thyroid mass and no thyromegaly present.   Cardiovascular: Normal rate, regular rhythm and normal heart sounds.  Exam reveals no gallop and no friction rub.    No murmur heard.  Pulmonary/Chest: Effort normal and breath sounds normal. No respiratory distress. She has no decreased breath sounds. She has no wheezes. She has no rhonchi. She has no rales.    Lymphadenopathy:     She has no cervical adenopathy.   Neurological: She is alert and oriented to person, place, and time.   Skin: Skin is warm and dry. No rash noted.   Psychiatric: She has a normal mood and affect. Her speech is normal and behavior is normal. Judgment and thought content normal. Cognition and memory are normal.   Nursing note and vitals reviewed.      Assessment/Plan   uRt was seen today for follow-up.    Diagnoses and all orders for this visit:    Moderate persistent asthma with acute exacerbation    Other orders  -     Fluticasone Furoate-Vilanterol (BREO ELLIPTA) 100-25 MCG/INH aerosol powder ; Inhale 1 puff Daily.      Patient Instructions   Use the Breo inhaler 1 inhalation once daily.  Be sure to rinse your mouth out after use.      Return in about 4 weeks (around 11/15/2018) for Recheck.    Scribed for Pipo Guillermo MD by Evelin Gonzalez CMA. 10/18/2018    IPipo MD personally performed the services described in this documentation, as scribed by Evelin Gonzalez CMA in my presence, and it is both accurate and complete

## 2018-11-20 ENCOUNTER — OFFICE VISIT (OUTPATIENT)
Dept: FAMILY MEDICINE CLINIC | Facility: CLINIC | Age: 78
End: 2018-11-20

## 2018-11-20 VITALS
DIASTOLIC BLOOD PRESSURE: 78 MMHG | BODY MASS INDEX: 30.18 KG/M2 | WEIGHT: 164 LBS | HEIGHT: 62 IN | TEMPERATURE: 98 F | HEART RATE: 80 BPM | SYSTOLIC BLOOD PRESSURE: 124 MMHG | RESPIRATION RATE: 16 BRPM | OXYGEN SATURATION: 95 %

## 2018-11-20 DIAGNOSIS — M65.4 DE QUERVAIN'S TENOSYNOVITIS, LEFT: ICD-10-CM

## 2018-11-20 DIAGNOSIS — J45.40 MODERATE PERSISTENT ASTHMA, UNSPECIFIED WHETHER COMPLICATED: Primary | ICD-10-CM

## 2018-11-20 PROCEDURE — 99214 OFFICE O/P EST MOD 30 MIN: CPT | Performed by: FAMILY MEDICINE

## 2018-11-20 NOTE — PROGRESS NOTES
Subjective   Rut Wright is a 78 y.o. female with   Chief Complaint   Patient presents with   • Follow-up   • Asthma   .    History of Present Illness     77 yo white female who presents for follow up on asthma.  She states her breathing was improved quite a bit while she was using the Breo inhaler.  She has been out of it for the last 2 days and she can tell a big difference.  Currently patient has had increased shortness of air although there has been no cough or chest pain.  Patient denies any recent upper respiratory infection, fever or myalgias.  She also complains of left wrist pain.  She was helping her  move wood and she has had pain ever since.  There has been no soft tissue swelling, morning stiffness or deformity.  Patient denies past history of trauma.  Pt is doing well otherwise and has no further complaints.    The following portions of the patient's history were reviewed and updated as appropriate: allergies, current medications, past family history, past medical history, past social history, past surgical history and problem list.    Review of Systems   Respiratory: Positive for cough and shortness of breath.    Musculoskeletal: Positive for myalgias (left wrist).   All other systems reviewed and are negative.      Objective     Vitals:    11/20/18 1010   BP: 124/78   Pulse: 80   Resp: 16   Temp: 98 °F (36.7 °C)   SpO2: 95%     BP Readings from Last 3 Encounters:   11/20/18 124/78   10/18/18 122/80   04/18/18 150/82      Wt Readings from Last 3 Encounters:   11/20/18 74.4 kg (164 lb)   10/18/18 72.6 kg (160 lb)   04/18/18 72.6 kg (160 lb)        No results found for this or any previous visit (from the past 168 hour(s)).    Physical Exam   Constitutional: She is oriented to person, place, and time. Vital signs are normal. She appears well-developed and well-nourished. She is cooperative.   HENT:   Head: Normocephalic and atraumatic.   Neck: Trachea normal and phonation normal. Neck supple.  Normal carotid pulses present. Carotid bruit is not present. No thyroid mass and no thyromegaly present.   Cardiovascular: Normal rate, regular rhythm and normal heart sounds. Exam reveals no gallop and no friction rub.   No murmur heard.  Pulmonary/Chest: Effort normal and breath sounds normal. No respiratory distress. She has no decreased breath sounds. She has no wheezes. She has no rhonchi. She has no rales.   Musculoskeletal:        Left wrist: She exhibits decreased range of motion and tenderness.   Left wrist with full range of motion, no evidence of soft tissue swelling or deformity.  Motor is 5 over 5 in neurovascular is intact distally.  Point of maximum tenderness is over the flexor tendon of left thumb.  Slight deformity observed at the base of the left thumb.  This area is nontender to palpation.   Lymphadenopathy:     She has no cervical adenopathy.   Neurological: She is alert and oriented to person, place, and time. She has normal strength. No sensory deficit.   Skin: Skin is warm and dry. No rash noted.   Psychiatric: She has a normal mood and affect. Her speech is normal and behavior is normal. Judgment and thought content normal. Cognition and memory are normal.   Nursing note and vitals reviewed.      Assessment/Plan   Rut was seen today for follow-up and asthma.    Diagnoses and all orders for this visit:    Moderate persistent asthma, unspecified whether complicated    De Quervain's tenosynovitis, left    Other orders  -     diclofenac (VOLTAREN) 1 % gel gel; Apply 4 g topically to the appropriate area as directed 4 (Four) Times a Day.        Return in about 3 months (around 2/20/2019).    Scribed for Pipo Guillermo MD by Evelin Gonzalez CMA. 11/20/2018    IPipo MD personally performed the services described in this documentation, as scribed by Evelin Gonzalez CMA in my presence, and it is both accurate and complete

## 2018-12-03 RX ORDER — MONTELUKAST SODIUM 10 MG/1
TABLET ORAL
Qty: 30 TABLET | Refills: 4 | Status: SHIPPED | OUTPATIENT
Start: 2018-12-03 | End: 2019-05-03 | Stop reason: SDUPTHER

## 2018-12-03 RX ORDER — VENLAFAXINE 75 MG/1
TABLET ORAL
Qty: 30 TABLET | Refills: 1 | Status: SHIPPED | OUTPATIENT
Start: 2018-12-03 | End: 2019-02-05 | Stop reason: SDUPTHER

## 2018-12-17 RX ORDER — EZETIMIBE AND SIMVASTATIN 10; 20 MG/1; MG/1
TABLET ORAL
Qty: 90 TABLET | Refills: 0 | Status: SHIPPED | OUTPATIENT
Start: 2018-12-17 | End: 2019-03-17 | Stop reason: SDUPTHER

## 2018-12-24 RX ORDER — ETODOLAC 400 MG/1
TABLET, FILM COATED ORAL
Qty: 90 TABLET | Refills: 0 | Status: SHIPPED | OUTPATIENT
Start: 2018-12-24 | End: 2019-03-22 | Stop reason: SDUPTHER

## 2019-01-14 ENCOUNTER — TELEPHONE (OUTPATIENT)
Dept: FAMILY MEDICINE CLINIC | Facility: CLINIC | Age: 79
End: 2019-01-14

## 2019-01-14 NOTE — TELEPHONE ENCOUNTER
How Severe Is Your Skin Lesion?: mild Pt informed that it is ok for her to take the 200 mcg inhaler because she has samples of the 200 mcg.   Has Your Skin Lesion Been Treated?: not been treated Is This A New Presentation, Or A Follow-Up?: Skin Lesions

## 2019-02-05 RX ORDER — VENLAFAXINE 75 MG/1
TABLET ORAL
Qty: 30 TABLET | Refills: 0 | Status: SHIPPED | OUTPATIENT
Start: 2019-02-05 | End: 2019-03-02 | Stop reason: SDUPTHER

## 2019-03-04 RX ORDER — VENLAFAXINE 75 MG/1
TABLET ORAL
Qty: 30 TABLET | Refills: 1 | Status: SHIPPED | OUTPATIENT
Start: 2019-03-04 | End: 2019-05-03 | Stop reason: SDUPTHER

## 2019-03-18 RX ORDER — EZETIMIBE AND SIMVASTATIN 10; 20 MG/1; MG/1
TABLET ORAL
Qty: 90 TABLET | Refills: 0 | Status: SHIPPED | OUTPATIENT
Start: 2019-03-18 | End: 2019-06-13 | Stop reason: SDUPTHER

## 2019-03-22 RX ORDER — ETODOLAC 400 MG/1
TABLET, FILM COATED ORAL
Qty: 90 TABLET | Refills: 0 | Status: SHIPPED | OUTPATIENT
Start: 2019-03-22 | End: 2019-07-02 | Stop reason: SDUPTHER

## 2019-05-06 RX ORDER — MONTELUKAST SODIUM 10 MG/1
TABLET ORAL
Qty: 30 TABLET | Refills: 3 | Status: SHIPPED | OUTPATIENT
Start: 2019-05-06 | End: 2019-09-10 | Stop reason: SDUPTHER

## 2019-05-06 RX ORDER — VENLAFAXINE 75 MG/1
TABLET ORAL
Qty: 30 TABLET | Refills: 3 | Status: SHIPPED | OUTPATIENT
Start: 2019-05-06 | End: 2019-05-21 | Stop reason: SDUPTHER

## 2019-05-21 ENCOUNTER — OFFICE VISIT (OUTPATIENT)
Dept: FAMILY MEDICINE CLINIC | Facility: CLINIC | Age: 79
End: 2019-05-21

## 2019-05-21 VITALS
DIASTOLIC BLOOD PRESSURE: 80 MMHG | HEIGHT: 62 IN | BODY MASS INDEX: 30.55 KG/M2 | OXYGEN SATURATION: 96 % | HEART RATE: 86 BPM | WEIGHT: 166 LBS | SYSTOLIC BLOOD PRESSURE: 154 MMHG

## 2019-05-21 DIAGNOSIS — E78.2 MIXED HYPERLIPIDEMIA: ICD-10-CM

## 2019-05-21 DIAGNOSIS — J30.89 PERENNIAL ALLERGIC RHINITIS: ICD-10-CM

## 2019-05-21 DIAGNOSIS — J45.40 MODERATE PERSISTENT ASTHMA WITHOUT COMPLICATION: ICD-10-CM

## 2019-05-21 DIAGNOSIS — R49.0 HOARSE VOICE QUALITY: Primary | ICD-10-CM

## 2019-05-21 DIAGNOSIS — E55.9 VITAMIN D DEFICIENCY: ICD-10-CM

## 2019-05-21 PROCEDURE — 99213 OFFICE O/P EST LOW 20 MIN: CPT | Performed by: FAMILY MEDICINE

## 2019-05-21 NOTE — PROGRESS NOTES
Subjective   Rut Wright is a 79 y.o. female with   Chief Complaint   Patient presents with   • Asthma     follow up   • Hyperlipidemia     last labs were in 4/2018   .    History of Present Illness   79-year-old white female with follow-up of asthma.  Patient has found success using Breo at 1 puff daily.  She does rinse her mouth out thereafter and has not had any issues with thrush.  Blood pressure is usually very well controlled.  Noted elevation of systolic blood pressure at this visit.  Last labs have been quite some time ago and included elevation in lipids.  Patient is currently not fasting.  She however continues to use products like Vytorin, Singulair, Breo, Flonase nasal spray, stay hist, and over-the-counter vitamin D3.  The following portions of the patient's history were reviewed and updated as appropriate: allergies, current medications, past family history, past medical history, past social history, past surgical history and problem list.    Review of Systems   Respiratory:        Uncomplicated asthma   Cardiovascular:        Hyperlipidemia   Allergic/Immunologic: Positive for environmental allergies.       Objective     Vitals:    05/21/19 1019   BP: 154/80   Pulse: 86   SpO2: 96%       No results found for this or any previous visit (from the past 168 hour(s)).    Physical Exam   Constitutional: She is oriented to person, place, and time. She appears well-developed and well-nourished.   HENT:   Head: Normocephalic and atraumatic.   Neck: Trachea normal and phonation normal. Neck supple. Normal carotid pulses present. Carotid bruit is not present. No thyroid mass and no thyromegaly present.   Cardiovascular: Normal rate, regular rhythm and normal heart sounds. Exam reveals no gallop and no friction rub.   No murmur heard.  Pulmonary/Chest: Effort normal and breath sounds normal. No respiratory distress. She has no decreased breath sounds. She has no wheezes. She has no rhonchi. She has no rales.    Lymphadenopathy:     She has no cervical adenopathy.   Neurological: She is alert and oriented to person, place, and time.   Skin: Skin is warm and dry. No rash noted.   Psychiatric: She has a normal mood and affect. Her speech is normal and behavior is normal. Judgment and thought content normal. Cognition and memory are normal.   Nursing note and vitals reviewed.      Assessment/Plan   Rut was seen today for asthma and hyperlipidemia.    Diagnoses and all orders for this visit:    Hoarse voice quality  -     Ambulatory Referral to ENT (Otolaryngology)  -     CBC & Differential; Future  -     Comprehensive Metabolic Panel; Future  -     Lipid Panel; Future  -     Vitamin D 25 Hydroxy; Future    Moderate persistent asthma without complication  -     CBC & Differential; Future  -     Comprehensive Metabolic Panel; Future  -     Lipid Panel; Future  -     Vitamin D 25 Hydroxy; Future    Perennial allergic rhinitis  -     CBC & Differential; Future  -     Comprehensive Metabolic Panel; Future  -     Lipid Panel; Future  -     Vitamin D 25 Hydroxy; Future    Vitamin D deficiency  -     CBC & Differential; Future  -     Comprehensive Metabolic Panel; Future  -     Lipid Panel; Future  -     Vitamin D 25 Hydroxy; Future    Mixed hyperlipidemia  -     CBC & Differential; Future  -     Comprehensive Metabolic Panel; Future  -     Lipid Panel; Future  -     Vitamin D 25 Hydroxy; Future        Return in about 3 months (around 8/21/2019) for Recheck.

## 2019-05-30 DIAGNOSIS — Z00.00 HEALTHCARE MAINTENANCE: Primary | ICD-10-CM

## 2019-05-30 DIAGNOSIS — J30.89 PERENNIAL ALLERGIC RHINITIS: ICD-10-CM

## 2019-05-30 DIAGNOSIS — E78.2 MIXED HYPERLIPIDEMIA: ICD-10-CM

## 2019-05-30 DIAGNOSIS — R49.0 HOARSE VOICE QUALITY: ICD-10-CM

## 2019-05-30 DIAGNOSIS — J45.40 MODERATE PERSISTENT ASTHMA WITHOUT COMPLICATION: ICD-10-CM

## 2019-05-30 DIAGNOSIS — E55.9 VITAMIN D DEFICIENCY: ICD-10-CM

## 2019-05-31 LAB
25(OH)D3+25(OH)D2 SERPL-MCNC: 73.7 NG/ML (ref 30–100)
ALBUMIN SERPL-MCNC: 4.3 G/DL (ref 3.5–5.2)
ALBUMIN/GLOB SERPL: 2.2 G/DL
ALP SERPL-CCNC: 78 U/L (ref 39–117)
ALT SERPL-CCNC: 16 U/L (ref 1–33)
AST SERPL-CCNC: 19 U/L (ref 1–32)
BASOPHILS # BLD AUTO: 0.06 10*3/MM3 (ref 0–0.2)
BASOPHILS NFR BLD AUTO: 1.1 % (ref 0–1.5)
BILIRUB SERPL-MCNC: 0.5 MG/DL (ref 0.2–1.2)
BUN SERPL-MCNC: 11 MG/DL (ref 8–23)
BUN/CREAT SERPL: 14.1 (ref 7–25)
CALCIUM SERPL-MCNC: 10 MG/DL (ref 8.6–10.5)
CHLORIDE SERPL-SCNC: 104 MMOL/L (ref 98–107)
CHOLEST SERPL-MCNC: 153 MG/DL (ref 0–200)
CO2 SERPL-SCNC: 25.3 MMOL/L (ref 22–29)
CREAT SERPL-MCNC: 0.78 MG/DL (ref 0.57–1)
EOSINOPHIL # BLD AUTO: 0.33 10*3/MM3 (ref 0–0.4)
EOSINOPHIL NFR BLD AUTO: 5.8 % (ref 0.3–6.2)
ERYTHROCYTE [DISTWIDTH] IN BLOOD BY AUTOMATED COUNT: 13 % (ref 12.3–15.4)
GLOBULIN SER CALC-MCNC: 2 GM/DL
GLUCOSE SERPL-MCNC: 99 MG/DL (ref 65–99)
HCT VFR BLD AUTO: 44 % (ref 34–46.6)
HDLC SERPL-MCNC: 69 MG/DL (ref 40–60)
HGB BLD-MCNC: 13.8 G/DL (ref 12–15.9)
IMM GRANULOCYTES # BLD AUTO: 0.02 10*3/MM3 (ref 0–0.05)
IMM GRANULOCYTES NFR BLD AUTO: 0.4 % (ref 0–0.5)
LDLC SERPL CALC-MCNC: 53 MG/DL (ref 0–100)
LYMPHOCYTES # BLD AUTO: 1.89 10*3/MM3 (ref 0.7–3.1)
LYMPHOCYTES NFR BLD AUTO: 33.4 % (ref 19.6–45.3)
MCH RBC QN AUTO: 29.1 PG (ref 26.6–33)
MCHC RBC AUTO-ENTMCNC: 31.4 G/DL (ref 31.5–35.7)
MCV RBC AUTO: 92.8 FL (ref 79–97)
MONOCYTES # BLD AUTO: 0.5 10*3/MM3 (ref 0.1–0.9)
MONOCYTES NFR BLD AUTO: 8.8 % (ref 5–12)
NEUTROPHILS # BLD AUTO: 2.86 10*3/MM3 (ref 1.7–7)
NEUTROPHILS NFR BLD AUTO: 50.5 % (ref 42.7–76)
NRBC BLD AUTO-RTO: 0.2 /100 WBC (ref 0–0.2)
PLATELET # BLD AUTO: 258 10*3/MM3 (ref 140–450)
POTASSIUM SERPL-SCNC: 4.5 MMOL/L (ref 3.5–5.2)
PROT SERPL-MCNC: 6.3 G/DL (ref 6–8.5)
RBC # BLD AUTO: 4.74 10*6/MM3 (ref 3.77–5.28)
SODIUM SERPL-SCNC: 139 MMOL/L (ref 136–145)
TRIGL SERPL-MCNC: 156 MG/DL (ref 0–150)
TSH SERPL DL<=0.005 MIU/L-ACNC: 2.79 MIU/ML (ref 0.27–4.2)
VLDLC SERPL CALC-MCNC: 31.2 MG/DL
WBC # BLD AUTO: 5.66 10*3/MM3 (ref 3.4–10.8)

## 2019-06-13 RX ORDER — EZETIMIBE AND SIMVASTATIN 10; 20 MG/1; MG/1
TABLET ORAL
Qty: 90 TABLET | Refills: 0 | Status: SHIPPED | OUTPATIENT
Start: 2019-06-13 | End: 2019-09-08 | Stop reason: SDUPTHER

## 2019-07-02 RX ORDER — ETODOLAC 400 MG/1
TABLET, FILM COATED ORAL
Qty: 90 TABLET | Refills: 0 | Status: SHIPPED | OUTPATIENT
Start: 2019-07-02 | End: 2019-09-30 | Stop reason: SDUPTHER

## 2019-08-12 ENCOUNTER — TELEPHONE (OUTPATIENT)
Dept: FAMILY MEDICINE CLINIC | Facility: CLINIC | Age: 79
End: 2019-08-12

## 2019-08-12 RX ORDER — ONDANSETRON 4 MG/1
4 TABLET, FILM COATED ORAL EVERY 8 HOURS PRN
Qty: 20 TABLET | Refills: 0 | Status: SHIPPED | OUTPATIENT
Start: 2019-08-12 | End: 2021-07-26 | Stop reason: SDUPTHER

## 2019-08-12 NOTE — TELEPHONE ENCOUNTER
For the last 3 weeks she has had nausea, dizziness and occasional vomiting.  It will occur for a short time during the day and only a few episodes daily.  She states that since Saturday she has had constant dizziness, nausea and has vomited over a dozen times.  She is going to try to get to pharmacy for a couple med refills and get Gatorade this afternoon.  Can we give her Zofran or anything?  I made her an appointment but couldn't get her in until Wednesday.  Unless you say it needs to be sooner.

## 2019-08-14 ENCOUNTER — OFFICE VISIT (OUTPATIENT)
Dept: FAMILY MEDICINE CLINIC | Facility: CLINIC | Age: 79
End: 2019-08-14

## 2019-08-14 VITALS
OXYGEN SATURATION: 96 % | HEART RATE: 95 BPM | HEIGHT: 62 IN | WEIGHT: 159 LBS | TEMPERATURE: 98 F | SYSTOLIC BLOOD PRESSURE: 132 MMHG | BODY MASS INDEX: 29.26 KG/M2 | DIASTOLIC BLOOD PRESSURE: 82 MMHG

## 2019-08-14 DIAGNOSIS — R11.2 NAUSEA AND VOMITING, INTRACTABILITY OF VOMITING NOT SPECIFIED, UNSPECIFIED VOMITING TYPE: ICD-10-CM

## 2019-08-14 DIAGNOSIS — R51.9 ACUTE NONINTRACTABLE HEADACHE, UNSPECIFIED HEADACHE TYPE: ICD-10-CM

## 2019-08-14 DIAGNOSIS — R27.0 ATAXIA: ICD-10-CM

## 2019-08-14 DIAGNOSIS — R42 VERTIGO: Primary | ICD-10-CM

## 2019-08-14 PROCEDURE — 99214 OFFICE O/P EST MOD 30 MIN: CPT | Performed by: FAMILY MEDICINE

## 2019-08-14 RX ORDER — MECLIZINE HYDROCHLORIDE 25 MG/1
25 TABLET ORAL 3 TIMES DAILY PRN
Qty: 30 TABLET | Refills: 0 | Status: SHIPPED | OUTPATIENT
Start: 2019-08-14 | End: 2021-07-26 | Stop reason: SDUPTHER

## 2019-08-18 NOTE — PROGRESS NOTES
Subjective   Rut Wright is a 79 y.o. female with   Chief Complaint   Patient presents with   • Dizziness   • Vomiting     shewill vomit with the dizzy spells    .    History of Present Illness   79-year-old white female with complaint of increase true vertigo that results in emesis.  She has difficulty turning her head or changing eye gaze due to exacerbating vertigo.  She has had episodes of same in the past and usually uses meclizine with eventual resolution.  He denies any recent head trauma or initiating event.  Episodes have been ongoing over the last week and patient denies upper respiratory infection.  She has been unable to drive secondary to the above.  Patient denies hearing loss and there has been no tinnitus.  The following portions of the patient's history were reviewed and updated as appropriate: allergies, current medications, past family history, past medical history, past social history, past surgical history and problem list.    Review of Systems   HENT: Negative for hearing loss and tinnitus.    Gastrointestinal: Positive for nausea and vomiting.   Neurological: Positive for dizziness.       Objective     Vitals:    08/14/19 1137   BP: 132/82   Pulse: 95   Temp: 98 °F (36.7 °C)   SpO2: 96%       No results found for this or any previous visit (from the past 672 hour(s)).    Physical Exam   Constitutional: She is oriented to person, place, and time. She appears well-developed and well-nourished.   HENT:   Head: Normocephalic and atraumatic.   Right Ear: Hearing, tympanic membrane, external ear and ear canal normal.   Left Ear: Hearing, tympanic membrane, external ear and ear canal normal.   Nose: Nose normal.   Mouth/Throat: Uvula is midline, oropharynx is clear and moist and mucous membranes are normal.   Eyes: Conjunctivae, EOM and lids are normal. Pupils are equal, round, and reactive to light.   Fundoscopic exam:       The right eye shows no AV nicking, no exudate, no hemorrhage and no  papilledema.        The left eye shows no AV nicking, no exudate, no hemorrhage and no papilledema.   Neck: Trachea normal and phonation normal. Neck supple. Normal carotid pulses present. Carotid bruit is not present. No thyroid mass and no thyromegaly present.   Cardiovascular: Normal rate, regular rhythm and normal heart sounds. Exam reveals no gallop and no friction rub.   No murmur heard.  Pulmonary/Chest: Effort normal and breath sounds normal. No respiratory distress. She has no decreased breath sounds. She has no wheezes. She has no rhonchi. She has no rales.   Lymphadenopathy:     She has no cervical adenopathy.   Neurological: She is alert and oriented to person, place, and time. She has normal strength and normal reflexes. No cranial nerve deficit or sensory deficit.   Rochelle-halls pike positive   Skin: Skin is warm and dry. No rash noted.   Psychiatric: She has a normal mood and affect. Her speech is normal and behavior is normal. Judgment and thought content normal. Cognition and memory are normal.   Nursing note and vitals reviewed.      Assessment/Plan   Rut was seen today for dizziness and vomiting.    Diagnoses and all orders for this visit:    Vertigo  -     meclizine (ANTIVERT) 25 MG tablet; Take 1 tablet by mouth 3 (Three) Times a Day As Needed for dizziness.  -     Ambulatory Referral to Physical Therapy Vestibular    Ataxia  -     meclizine (ANTIVERT) 25 MG tablet; Take 1 tablet by mouth 3 (Three) Times a Day As Needed for dizziness.    Acute nonintractable headache, unspecified headache type    Nausea and vomiting, intractability of vomiting not specified, unspecified vomiting type        Return if symptoms worsen or fail to improve.

## 2019-09-09 ENCOUNTER — TREATMENT (OUTPATIENT)
Dept: PHYSICAL THERAPY | Facility: CLINIC | Age: 79
End: 2019-09-09

## 2019-09-09 DIAGNOSIS — R42 VERTIGO: Primary | ICD-10-CM

## 2019-09-09 PROCEDURE — 97161 PT EVAL LOW COMPLEX 20 MIN: CPT | Performed by: PHYSICAL THERAPIST

## 2019-09-09 PROCEDURE — 95992 CANALITH REPOSITIONING PROC: CPT | Performed by: PHYSICAL THERAPIST

## 2019-09-09 RX ORDER — EZETIMIBE AND SIMVASTATIN 10; 20 MG/1; MG/1
TABLET ORAL
Qty: 90 TABLET | Refills: 1 | Status: SHIPPED | OUTPATIENT
Start: 2019-09-09 | End: 2020-03-09

## 2019-09-09 NOTE — PROGRESS NOTES
Physical Therapy Initial Evaluation-  Vestibular Therapy    Patient Name: Rut Wright       Patient MRN: [id number]  : 1940  Physician:Pipo Guillermo DO  Date: 2019  Encounter Diagnoses   Name Primary?   • Vertigo Yes     Objective Testing:  Positional Testing  Positional Testing: With infrared goggles  Vertebrobasilar Artery Screen - Right: Negative  Vertebrobasilar Artery Screen - Left: Negative  Rochelle-Hallpike Right: Downbeat, right rotatory nystagmus(lightheadedness upon sitting up)  Rochelle-Hallpike Left: No nystagmus  Horizontal Roll Test Right: No nystagmus  Horizontal Roll Test Left: No nystagmus  DHI:     THERAPY ASSESSMENT: Patient is a 79 y.o. female. Patient presents to physical therapy due to complaints of episodes of dizziness/vertigo that have occurred 4x in the past 40 years. She reports spontaneous onset with symptoms lasting 1-2 days. Latest episode was 3 weeks ago. Symptoms were resolved within 2 days. Despite have no c/o current symptoms, positional testing was performed. Signs and symptoms are consistent with R AC canalithiasis BPPV. CRP was performed today. HEP was given if symptoms return. Patient is a good candidate for physical therapy to address the following:    Functional Limitations: Decreased ability to perform ADL's   Length of Therapy: 1 month   PT Frequency: return if needed  PT Interventions: Home Exercise Program, CRP   Patient Agrees with Plan of Care: Yes    REHAB POTENTIAL: excellent            SHORT TERM GOALS: To be met in 1 weeks:  1. Patient is independent with HEP.    Other Procedure CPT 90332 minutes 0    Timed Code Treatment: 0   Minutes     Total Treatment Time: 30      Minutes      PT SIGNATURE: Claudia Mejia PT, DPT, CHT   [place] license #[id number]  DATE TREATMENT INITIATED: 2019     Medicare Initial Certification  Certification Period: 2019  I certify that the therapy services are furnished while this patient is under my care.  The services  outlined above are required by this patient, and will be reviewed every 90 days.     PHYSICIAN: Pipo Guillermo, DO      DATE:     Please sign and return via fax to [phone].. Thank you, [facility].

## 2019-09-11 RX ORDER — VENLAFAXINE 75 MG/1
TABLET ORAL
Qty: 30 TABLET | Refills: 5 | Status: SHIPPED | OUTPATIENT
Start: 2019-09-11 | End: 2020-03-13

## 2019-09-11 RX ORDER — MONTELUKAST SODIUM 10 MG/1
TABLET ORAL
Qty: 30 TABLET | Refills: 5 | Status: SHIPPED | OUTPATIENT
Start: 2019-09-11 | End: 2020-03-16

## 2019-10-01 RX ORDER — ETODOLAC 400 MG/1
TABLET, FILM COATED ORAL
Qty: 90 TABLET | Refills: 1 | Status: SHIPPED | OUTPATIENT
Start: 2019-10-01 | End: 2020-03-30

## 2019-10-10 ENCOUNTER — FLU SHOT (OUTPATIENT)
Dept: FAMILY MEDICINE CLINIC | Facility: CLINIC | Age: 79
End: 2019-10-10

## 2019-10-10 DIAGNOSIS — Z23 IMMUNIZATION, PNEUMOCOCCUS AND INFLUENZA: ICD-10-CM

## 2019-10-10 PROCEDURE — 90653 IIV ADJUVANT VACCINE IM: CPT | Performed by: FAMILY MEDICINE

## 2019-10-10 PROCEDURE — G0008 ADMIN INFLUENZA VIRUS VAC: HCPCS | Performed by: FAMILY MEDICINE

## 2019-10-15 ENCOUNTER — TELEPHONE (OUTPATIENT)
Dept: FAMILY MEDICINE CLINIC | Facility: CLINIC | Age: 79
End: 2019-10-15

## 2019-10-15 NOTE — TELEPHONE ENCOUNTER
Returned call to patient to inform her that she receive the high dose flu shot.    No answer LVM for her to call back.

## 2020-03-09 RX ORDER — EZETIMIBE AND SIMVASTATIN 10; 20 MG/1; MG/1
TABLET ORAL
Qty: 30 TABLET | Refills: 0 | Status: SHIPPED | OUTPATIENT
Start: 2020-03-09 | End: 2020-04-07

## 2020-03-13 RX ORDER — VENLAFAXINE 75 MG/1
TABLET ORAL
Qty: 30 TABLET | Refills: 0 | Status: SHIPPED | OUTPATIENT
Start: 2020-03-13 | End: 2020-04-13

## 2020-03-16 RX ORDER — MONTELUKAST SODIUM 10 MG/1
TABLET ORAL
Qty: 30 TABLET | Refills: 0 | Status: SHIPPED | OUTPATIENT
Start: 2020-03-16 | End: 2020-04-17

## 2020-03-30 RX ORDER — ETODOLAC 400 MG/1
TABLET, FILM COATED ORAL
Qty: 90 TABLET | Refills: 0 | Status: SHIPPED | OUTPATIENT
Start: 2020-03-30 | End: 2020-07-06

## 2020-04-07 RX ORDER — EZETIMIBE AND SIMVASTATIN 10; 20 MG/1; MG/1
TABLET ORAL
Qty: 30 TABLET | Refills: 0 | Status: SHIPPED | OUTPATIENT
Start: 2020-04-07 | End: 2020-05-06

## 2020-04-13 RX ORDER — VENLAFAXINE 75 MG/1
75 TABLET ORAL DAILY
Qty: 30 TABLET | Refills: 0 | Status: SHIPPED | OUTPATIENT
Start: 2020-04-13 | End: 2020-08-05 | Stop reason: SDUPTHER

## 2020-04-13 RX ORDER — VENLAFAXINE 75 MG/1
TABLET ORAL
Qty: 30 TABLET | Refills: 0 | Status: SHIPPED | OUTPATIENT
Start: 2020-04-13 | End: 2020-04-17

## 2020-04-17 RX ORDER — VENLAFAXINE 75 MG/1
TABLET ORAL
Qty: 30 TABLET | Refills: 0 | Status: SHIPPED | OUTPATIENT
Start: 2020-04-17 | End: 2020-08-05

## 2020-04-17 RX ORDER — MONTELUKAST SODIUM 10 MG/1
TABLET ORAL
Qty: 30 TABLET | Refills: 0 | Status: SHIPPED | OUTPATIENT
Start: 2020-04-17 | End: 2020-05-22

## 2020-04-22 RX ORDER — VENLAFAXINE 75 MG/1
TABLET ORAL
Qty: 30 TABLET | Refills: 0 | Status: SHIPPED | OUTPATIENT
Start: 2020-04-22 | End: 2020-08-05

## 2020-05-06 RX ORDER — EZETIMIBE AND SIMVASTATIN 10; 20 MG/1; MG/1
TABLET ORAL
Qty: 30 TABLET | Refills: 0 | Status: SHIPPED | OUTPATIENT
Start: 2020-05-06 | End: 2020-06-03

## 2020-05-22 RX ORDER — MONTELUKAST SODIUM 10 MG/1
TABLET ORAL
Qty: 30 TABLET | Refills: 0 | Status: SHIPPED | OUTPATIENT
Start: 2020-05-22 | End: 2020-06-18

## 2020-06-03 RX ORDER — EZETIMIBE AND SIMVASTATIN 10; 20 MG/1; MG/1
TABLET ORAL
Qty: 30 TABLET | Refills: 0 | Status: SHIPPED | OUTPATIENT
Start: 2020-06-03 | End: 2020-08-05 | Stop reason: SDUPTHER

## 2020-06-18 RX ORDER — MONTELUKAST SODIUM 10 MG/1
TABLET ORAL
Qty: 30 TABLET | Refills: 0 | Status: SHIPPED | OUTPATIENT
Start: 2020-06-18 | End: 2020-07-23

## 2020-07-02 RX ORDER — EZETIMIBE AND SIMVASTATIN 10; 20 MG/1; MG/1
TABLET ORAL
Qty: 30 TABLET | Refills: 0 | OUTPATIENT
Start: 2020-07-02

## 2020-07-06 RX ORDER — ETODOLAC 400 MG/1
400 TABLET, FILM COATED ORAL DAILY PRN
Qty: 30 TABLET | Refills: 0 | Status: SHIPPED | OUTPATIENT
Start: 2020-07-06 | End: 2020-08-05 | Stop reason: SDUPTHER

## 2020-07-23 RX ORDER — MONTELUKAST SODIUM 10 MG/1
TABLET ORAL
Qty: 30 TABLET | Refills: 0 | Status: SHIPPED | OUTPATIENT
Start: 2020-07-23 | End: 2020-08-05 | Stop reason: SDUPTHER

## 2020-08-05 ENCOUNTER — OFFICE VISIT (OUTPATIENT)
Dept: FAMILY MEDICINE CLINIC | Facility: CLINIC | Age: 80
End: 2020-08-05

## 2020-08-05 VITALS
HEIGHT: 62 IN | OXYGEN SATURATION: 97 % | HEART RATE: 95 BPM | BODY MASS INDEX: 29.26 KG/M2 | TEMPERATURE: 97.7 F | WEIGHT: 159 LBS | DIASTOLIC BLOOD PRESSURE: 80 MMHG | SYSTOLIC BLOOD PRESSURE: 120 MMHG

## 2020-08-05 DIAGNOSIS — M15.9 GENERALIZED OSTEOARTHRITIS: ICD-10-CM

## 2020-08-05 DIAGNOSIS — J30.89 PERENNIAL ALLERGIC RHINITIS: ICD-10-CM

## 2020-08-05 DIAGNOSIS — E78.2 MIXED HYPERLIPIDEMIA: Primary | ICD-10-CM

## 2020-08-05 DIAGNOSIS — J45.40 MODERATE PERSISTENT ASTHMA WITHOUT COMPLICATION: ICD-10-CM

## 2020-08-05 DIAGNOSIS — F41.8 MIXED ANXIETY DEPRESSIVE DISORDER: ICD-10-CM

## 2020-08-05 DIAGNOSIS — E55.9 VITAMIN D DEFICIENCY: ICD-10-CM

## 2020-08-05 PROCEDURE — 99213 OFFICE O/P EST LOW 20 MIN: CPT | Performed by: FAMILY MEDICINE

## 2020-08-05 RX ORDER — MONTELUKAST SODIUM 10 MG/1
10 TABLET ORAL NIGHTLY
Qty: 90 TABLET | Refills: 1 | Status: SHIPPED | OUTPATIENT
Start: 2020-08-05 | End: 2021-03-01

## 2020-08-05 RX ORDER — ACETAMINOPHEN 160 MG
2000 TABLET,DISINTEGRATING ORAL DAILY
COMMUNITY

## 2020-08-05 RX ORDER — VENLAFAXINE 75 MG/1
75 TABLET ORAL DAILY
Qty: 90 TABLET | Refills: 1 | Status: SHIPPED | OUTPATIENT
Start: 2020-08-05 | End: 2021-02-22

## 2020-08-05 RX ORDER — EZETIMIBE AND SIMVASTATIN 10; 20 MG/1; MG/1
1 TABLET ORAL NIGHTLY
Qty: 90 TABLET | Refills: 1 | Status: SHIPPED | OUTPATIENT
Start: 2020-08-05 | End: 2021-02-11

## 2020-08-05 RX ORDER — ANASTROZOLE 1 MG/1
TABLET ORAL DAILY
COMMUNITY

## 2020-08-05 RX ORDER — ETODOLAC 400 MG/1
TABLET, FILM COATED ORAL
Qty: 30 TABLET | Refills: 5 | Status: SHIPPED | OUTPATIENT
Start: 2020-08-05 | End: 2021-02-19

## 2020-08-05 NOTE — PROGRESS NOTES
Subjective   Rut Wright is a 80 y.o. female with   Chief Complaint   Patient presents with   • Hyperlipidemia     pt is due for labs, not fasting today   • Anxiety     follow up meds   .    History of Present Illness   80-year-old white female with history of hyperlipidemia here for further medical management.  Patient has been using Vytorin on a regular basis with last fasting labs dated May of 2019.  Patient is also using Effexor XR at 75 mg daily with increased depression and anxiety.  Overall this has been adequate in controlling the symptoms.  She has a history of breast cancer and is using Arimidex.  There is also history of perennial allergic rhinitis with moderate intermittent uncomplicated asthma.  She is using Singulair as well as Brio Ellipta and she does have a pro-air HFA inhaler as a rescue inhaler.  Medications are used appropriately and she has seen no side effects.  She is currently not fasting.  The following portions of the patient's history were reviewed and updated as appropriate: allergies, current medications, past family history, past medical history, past social history, past surgical history and problem list.    Review of Systems   Respiratory:        Mild intermittent uncomplicated asthma   Cardiovascular:        Hyperlipidemia   Allergic/Immunologic: Positive for environmental allergies.   Psychiatric/Behavioral: Positive for dysphoric mood. The patient is nervous/anxious.        Objective     Vitals:    08/05/20 1111   BP: 120/80   Pulse: 95   Temp: 97.7 °F (36.5 °C)   SpO2: 97%       No results found for this or any previous visit (from the past 672 hour(s)).    Physical Exam   Constitutional: She is oriented to person, place, and time. She appears well-developed and well-nourished.   HENT:   Head: Normocephalic and atraumatic.   Neck: Trachea normal and phonation normal. Neck supple. Normal carotid pulses present. Carotid bruit is not present. No thyroid mass and no thyromegaly  present.   Cardiovascular: Normal rate, regular rhythm and normal heart sounds. Exam reveals no gallop and no friction rub.   No murmur heard.  Pulmonary/Chest: Effort normal and breath sounds normal. No respiratory distress. She has no decreased breath sounds. She has no wheezes. She has no rhonchi. She has no rales.   Lymphadenopathy:     She has no cervical adenopathy.   Neurological: She is alert and oriented to person, place, and time.   Skin: Skin is warm and dry. No rash noted.   Psychiatric: She has a normal mood and affect. Her speech is normal and behavior is normal. Judgment and thought content normal. Cognition and memory are normal.   Nursing note and vitals reviewed.      Assessment/Plan   Rut was seen today for hyperlipidemia and anxiety.    Diagnoses and all orders for this visit:    Mixed hyperlipidemia  -     ezetimibe-simvastatin (VYTORIN) 10-20 MG per tablet; Take 1 tablet by mouth Every Night.  -     CBC w AUTO Differential; Future  -     Lipid panel; Future  -     Comprehensive metabolic panel; Future  -     TSH; Future  -     Vitamin D 25 hydroxy; Future    Vitamin D deficiency  -     CBC w AUTO Differential; Future  -     Lipid panel; Future  -     Comprehensive metabolic panel; Future  -     TSH; Future  -     Vitamin D 25 hydroxy; Future    Perennial allergic rhinitis  -     CBC w AUTO Differential; Future  -     Lipid panel; Future  -     Comprehensive metabolic panel; Future  -     TSH; Future  -     Vitamin D 25 hydroxy; Future    Moderate persistent asthma without complication  -     montelukast (SINGULAIR) 10 MG tablet; Take 1 tablet by mouth Every Night.  -     Fluticasone Furoate-Vilanterol (Breo Ellipta) 100-25 MCG/INH inhaler; Inhale 1 puff Daily.  -     CBC w AUTO Differential; Future  -     Lipid panel; Future  -     Comprehensive metabolic panel; Future  -     TSH; Future  -     Vitamin D 25 hydroxy; Future    Mixed anxiety depressive disorder  -     venlafaxine (EFFEXOR) 75  MG tablet; Take 1 tablet by mouth Daily.  -     CBC w AUTO Differential; Future  -     Lipid panel; Future  -     Comprehensive metabolic panel; Future  -     TSH; Future  -     Vitamin D 25 hydroxy; Future    Generalized osteoarthritis  -     etodolac (LODINE) 400 MG tablet; 1 p.o. daily as needed  -     CBC w AUTO Differential; Future  -     Lipid panel; Future  -     Comprehensive metabolic panel; Future  -     TSH; Future  -     Vitamin D 25 hydroxy; Future        Return in about 6 months (around 2/5/2021) for Recheck.

## 2020-08-06 DIAGNOSIS — F41.8 MIXED ANXIETY DEPRESSIVE DISORDER: ICD-10-CM

## 2020-08-06 DIAGNOSIS — M15.9 GENERALIZED OSTEOARTHRITIS: ICD-10-CM

## 2020-08-06 DIAGNOSIS — J45.40 MODERATE PERSISTENT ASTHMA WITHOUT COMPLICATION: ICD-10-CM

## 2020-08-06 DIAGNOSIS — E55.9 VITAMIN D DEFICIENCY: ICD-10-CM

## 2020-08-06 DIAGNOSIS — J30.89 PERENNIAL ALLERGIC RHINITIS: ICD-10-CM

## 2020-08-06 DIAGNOSIS — E78.2 MIXED HYPERLIPIDEMIA: ICD-10-CM

## 2020-08-07 DIAGNOSIS — M15.9 GENERALIZED OSTEOARTHRITIS: ICD-10-CM

## 2020-08-08 LAB
25(OH)D3+25(OH)D2 SERPL-MCNC: 56.2 NG/ML (ref 30–100)
ALBUMIN SERPL-MCNC: 4.3 G/DL (ref 3.5–5.2)
ALBUMIN/GLOB SERPL: 2.4 G/DL
ALP SERPL-CCNC: 80 U/L (ref 39–117)
ALT SERPL-CCNC: 16 U/L (ref 1–33)
AST SERPL-CCNC: 18 U/L (ref 1–32)
BASOPHILS # BLD AUTO: 0.04 10*3/MM3 (ref 0–0.2)
BASOPHILS NFR BLD AUTO: 0.6 % (ref 0–1.5)
BILIRUB SERPL-MCNC: 0.5 MG/DL (ref 0–1.2)
BUN SERPL-MCNC: 13 MG/DL (ref 8–23)
BUN/CREAT SERPL: 14.3 (ref 7–25)
CALCIUM SERPL-MCNC: 9.6 MG/DL (ref 8.6–10.5)
CHLORIDE SERPL-SCNC: 101 MMOL/L (ref 98–107)
CHOLEST SERPL-MCNC: 151 MG/DL (ref 0–200)
CO2 SERPL-SCNC: 24.9 MMOL/L (ref 22–29)
CREAT SERPL-MCNC: 0.91 MG/DL (ref 0.57–1)
EOSINOPHIL # BLD AUTO: 0.39 10*3/MM3 (ref 0–0.4)
EOSINOPHIL NFR BLD AUTO: 6.2 % (ref 0.3–6.2)
ERYTHROCYTE [DISTWIDTH] IN BLOOD BY AUTOMATED COUNT: 12.9 % (ref 12.3–15.4)
GLOBULIN SER CALC-MCNC: 1.8 GM/DL
GLUCOSE SERPL-MCNC: 98 MG/DL (ref 65–99)
HCT VFR BLD AUTO: 42.9 % (ref 34–46.6)
HDLC SERPL-MCNC: 64 MG/DL (ref 40–60)
HGB BLD-MCNC: 13.9 G/DL (ref 12–15.9)
IMM GRANULOCYTES # BLD AUTO: 0.01 10*3/MM3 (ref 0–0.05)
IMM GRANULOCYTES NFR BLD AUTO: 0.2 % (ref 0–0.5)
LDLC SERPL CALC-MCNC: 51 MG/DL (ref 0–100)
LYMPHOCYTES # BLD AUTO: 2.05 10*3/MM3 (ref 0.7–3.1)
LYMPHOCYTES NFR BLD AUTO: 32.5 % (ref 19.6–45.3)
MCH RBC QN AUTO: 29.8 PG (ref 26.6–33)
MCHC RBC AUTO-ENTMCNC: 32.4 G/DL (ref 31.5–35.7)
MCV RBC AUTO: 92.1 FL (ref 79–97)
MONOCYTES # BLD AUTO: 0.54 10*3/MM3 (ref 0.1–0.9)
MONOCYTES NFR BLD AUTO: 8.6 % (ref 5–12)
NEUTROPHILS # BLD AUTO: 3.28 10*3/MM3 (ref 1.7–7)
NEUTROPHILS NFR BLD AUTO: 51.9 % (ref 42.7–76)
NRBC BLD AUTO-RTO: 0 /100 WBC (ref 0–0.2)
PLATELET # BLD AUTO: 251 10*3/MM3 (ref 140–450)
POTASSIUM SERPL-SCNC: 4.5 MMOL/L (ref 3.5–5.2)
PROT SERPL-MCNC: 6.1 G/DL (ref 6–8.5)
RBC # BLD AUTO: 4.66 10*6/MM3 (ref 3.77–5.28)
SODIUM SERPL-SCNC: 138 MMOL/L (ref 136–145)
TRIGL SERPL-MCNC: 179 MG/DL (ref 0–150)
TSH SERPL DL<=0.005 MIU/L-ACNC: 2.82 UIU/ML (ref 0.27–4.2)
VLDLC SERPL CALC-MCNC: 35.8 MG/DL
WBC # BLD AUTO: 6.31 10*3/MM3 (ref 3.4–10.8)

## 2020-08-10 DIAGNOSIS — M15.9 GENERALIZED OSTEOARTHRITIS: ICD-10-CM

## 2020-08-10 RX ORDER — ETODOLAC 400 MG/1
TABLET, FILM COATED ORAL
Qty: 30 TABLET | Refills: 0 | OUTPATIENT
Start: 2020-08-10

## 2020-08-11 RX ORDER — ETODOLAC 400 MG/1
TABLET, FILM COATED ORAL
Qty: 30 TABLET | Refills: 0 | OUTPATIENT
Start: 2020-08-11

## 2020-08-19 DIAGNOSIS — F41.8 MIXED ANXIETY DEPRESSIVE DISORDER: ICD-10-CM

## 2020-08-20 RX ORDER — VENLAFAXINE 75 MG/1
TABLET ORAL
Qty: 30 TABLET | Refills: 0 | OUTPATIENT
Start: 2020-08-20

## 2020-09-21 ENCOUNTER — FLU SHOT (OUTPATIENT)
Dept: FAMILY MEDICINE CLINIC | Facility: CLINIC | Age: 80
End: 2020-09-21

## 2020-09-21 DIAGNOSIS — Z23 NEED FOR INFLUENZA VACCINATION: Primary | ICD-10-CM

## 2020-09-21 PROCEDURE — G0008 ADMIN INFLUENZA VIRUS VAC: HCPCS | Performed by: FAMILY MEDICINE

## 2020-09-21 PROCEDURE — 90694 VACC AIIV4 NO PRSRV 0.5ML IM: CPT | Performed by: FAMILY MEDICINE

## 2021-01-01 DIAGNOSIS — J45.40 MODERATE PERSISTENT ASTHMA WITHOUT COMPLICATION: ICD-10-CM

## 2021-01-01 LAB
25(OH)D3+25(OH)D2 SERPL-MCNC: 37.3 NG/ML (ref 30–100)
ALBUMIN SERPL-MCNC: 4.4 G/DL (ref 3.7–4.7)
ALBUMIN/GLOB SERPL: 1.9 {RATIO} (ref 1.2–2.2)
ALP SERPL-CCNC: 99 IU/L (ref 44–121)
ALT SERPL-CCNC: 19 IU/L (ref 0–32)
AST SERPL-CCNC: 23 IU/L (ref 0–40)
BASOPHILS # BLD AUTO: 0.1 X10E3/UL (ref 0–0.2)
BASOPHILS NFR BLD AUTO: 1 %
BILIRUB SERPL-MCNC: 0.4 MG/DL (ref 0–1.2)
BUN SERPL-MCNC: 12 MG/DL (ref 8–27)
BUN/CREAT SERPL: 15 (ref 12–28)
CALCIUM SERPL-MCNC: 9.8 MG/DL (ref 8.7–10.3)
CHLORIDE SERPL-SCNC: 104 MMOL/L (ref 96–106)
CHOLEST SERPL-MCNC: 165 MG/DL (ref 100–199)
CO2 SERPL-SCNC: 25 MMOL/L (ref 20–29)
CREAT SERPL-MCNC: 0.8 MG/DL (ref 0.57–1)
EOSINOPHIL # BLD AUTO: 0.7 X10E3/UL (ref 0–0.4)
EOSINOPHIL NFR BLD AUTO: 9 %
ERYTHROCYTE [DISTWIDTH] IN BLOOD BY AUTOMATED COUNT: 13.2 % (ref 11.7–15.4)
GLOBULIN SER CALC-MCNC: 2.3 G/DL (ref 1.5–4.5)
GLUCOSE SERPL-MCNC: 100 MG/DL (ref 65–99)
HCT VFR BLD AUTO: 42.9 % (ref 34–46.6)
HDLC SERPL-MCNC: 69 MG/DL
HGB BLD-MCNC: 14.2 G/DL (ref 11.1–15.9)
IMM GRANULOCYTES # BLD AUTO: 0 X10E3/UL (ref 0–0.1)
IMM GRANULOCYTES NFR BLD AUTO: 0 %
LDLC SERPL CALC-MCNC: 65 MG/DL (ref 0–99)
LYMPHOCYTES # BLD AUTO: 2.2 X10E3/UL (ref 0.7–3.1)
LYMPHOCYTES NFR BLD AUTO: 30 %
MCH RBC QN AUTO: 28.9 PG (ref 26.6–33)
MCHC RBC AUTO-ENTMCNC: 33.1 G/DL (ref 31.5–35.7)
MCV RBC AUTO: 87 FL (ref 79–97)
MONOCYTES # BLD AUTO: 0.6 X10E3/UL (ref 0.1–0.9)
MONOCYTES NFR BLD AUTO: 8 %
NEUTROPHILS # BLD AUTO: 3.8 X10E3/UL (ref 1.4–7)
NEUTROPHILS NFR BLD AUTO: 52 %
PLATELET # BLD AUTO: 304 X10E3/UL (ref 150–450)
POTASSIUM SERPL-SCNC: 4 MMOL/L (ref 3.5–5.2)
PROT SERPL-MCNC: 6.7 G/DL (ref 6–8.5)
RBC # BLD AUTO: 4.91 X10E6/UL (ref 3.77–5.28)
SODIUM SERPL-SCNC: 140 MMOL/L (ref 134–144)
TRIGL SERPL-MCNC: 188 MG/DL (ref 0–149)
TSH SERPL DL<=0.005 MIU/L-ACNC: 5.56 UIU/ML (ref 0.45–4.5)
VLDLC SERPL CALC-MCNC: 31 MG/DL (ref 5–40)
WBC # BLD AUTO: 7.3 X10E3/UL (ref 3.4–10.8)

## 2021-01-01 RX ORDER — MONTELUKAST SODIUM 10 MG/1
TABLET ORAL
Qty: 90 TABLET | Refills: 1 | Status: SHIPPED | OUTPATIENT
Start: 2021-01-01 | End: 2022-01-01

## 2021-01-20 ENCOUNTER — IMMUNIZATION (OUTPATIENT)
Dept: VACCINE CLINIC | Facility: HOSPITAL | Age: 81
End: 2021-01-20

## 2021-01-20 PROCEDURE — 0011A: CPT | Performed by: OBSTETRICS & GYNECOLOGY

## 2021-01-20 PROCEDURE — 91301 HC SARSCO02 VAC 100MCG/0.5ML IM: CPT | Performed by: OBSTETRICS & GYNECOLOGY

## 2021-02-11 DIAGNOSIS — E78.2 MIXED HYPERLIPIDEMIA: ICD-10-CM

## 2021-02-11 RX ORDER — EZETIMIBE AND SIMVASTATIN 10; 20 MG/1; MG/1
TABLET ORAL
Qty: 90 TABLET | Refills: 0 | Status: SHIPPED | OUTPATIENT
Start: 2021-02-11 | End: 2021-02-16

## 2021-02-15 DIAGNOSIS — E78.2 MIXED HYPERLIPIDEMIA: ICD-10-CM

## 2021-02-16 RX ORDER — EZETIMIBE AND SIMVASTATIN 10; 20 MG/1; MG/1
TABLET ORAL
Qty: 90 TABLET | Refills: 0 | Status: SHIPPED | OUTPATIENT
Start: 2021-02-16 | End: 2021-05-03 | Stop reason: SDUPTHER

## 2021-02-17 ENCOUNTER — IMMUNIZATION (OUTPATIENT)
Dept: VACCINE CLINIC | Facility: HOSPITAL | Age: 81
End: 2021-02-17

## 2021-02-17 PROCEDURE — 0012A: CPT | Performed by: OBSTETRICS & GYNECOLOGY

## 2021-02-17 PROCEDURE — 91301 HC SARSCO02 VAC 100MCG/0.5ML IM: CPT | Performed by: OBSTETRICS & GYNECOLOGY

## 2021-02-18 DIAGNOSIS — M15.9 GENERALIZED OSTEOARTHRITIS: ICD-10-CM

## 2021-02-19 RX ORDER — ETODOLAC 400 MG/1
TABLET, FILM COATED ORAL
Qty: 30 TABLET | Refills: 4 | Status: SHIPPED | OUTPATIENT
Start: 2021-02-19 | End: 2021-08-16

## 2021-02-21 ENCOUNTER — HOSPITAL ENCOUNTER (EMERGENCY)
Facility: HOSPITAL | Age: 81
Discharge: HOME | End: 2021-02-21
Attending: EMERGENCY MEDICINE | Admitting: EMERGENCY MEDICINE

## 2021-02-21 ENCOUNTER — APPOINTMENT (OUTPATIENT)
Dept: GENERAL RADIOLOGY | Facility: HOSPITAL | Age: 81
End: 2021-02-21

## 2021-02-21 VITALS
WEIGHT: 160 LBS | TEMPERATURE: 98.5 F | HEIGHT: 62 IN | RESPIRATION RATE: 16 BRPM | OXYGEN SATURATION: 99 % | SYSTOLIC BLOOD PRESSURE: 148 MMHG | BODY MASS INDEX: 29.44 KG/M2 | DIASTOLIC BLOOD PRESSURE: 85 MMHG | HEART RATE: 84 BPM

## 2021-02-21 DIAGNOSIS — S62.660B OPEN NONDISPLACED FRACTURE OF DISTAL PHALANX OF RIGHT INDEX FINGER, INITIAL ENCOUNTER: Primary | ICD-10-CM

## 2021-02-21 DIAGNOSIS — S61.210A LACERATION OF RIGHT INDEX FINGER WITHOUT DAMAGE TO NAIL, FOREIGN BODY PRESENCE UNSPECIFIED, INITIAL ENCOUNTER: ICD-10-CM

## 2021-02-21 PROCEDURE — 73140 X-RAY EXAM OF FINGER(S): CPT

## 2021-02-21 PROCEDURE — 12002 RPR S/N/AX/GEN/TRNK2.6-7.5CM: CPT | Performed by: PHYSICIAN ASSISTANT

## 2021-02-21 PROCEDURE — 99283 EMERGENCY DEPT VISIT LOW MDM: CPT

## 2021-02-21 PROCEDURE — 99283 EMERGENCY DEPT VISIT LOW MDM: CPT | Performed by: PHYSICIAN ASSISTANT

## 2021-02-21 RX ORDER — CLINDAMYCIN HYDROCHLORIDE 300 MG/1
300 CAPSULE ORAL 3 TIMES DAILY
Qty: 30 CAPSULE | Refills: 0 | Status: SHIPPED | OUTPATIENT
Start: 2021-02-21 | End: 2021-03-03

## 2021-02-21 RX ORDER — LIDOCAINE HYDROCHLORIDE 20 MG/ML
10 INJECTION, SOLUTION INFILTRATION; PERINEURAL ONCE
Status: COMPLETED | OUTPATIENT
Start: 2021-02-21 | End: 2021-02-21

## 2021-02-21 RX ORDER — CLINDAMYCIN HYDROCHLORIDE 150 MG/1
300 CAPSULE ORAL ONCE
Status: COMPLETED | OUTPATIENT
Start: 2021-02-21 | End: 2021-02-21

## 2021-02-21 RX ADMIN — CLINDAMYCIN HYDROCHLORIDE 300 MG: 150 CAPSULE ORAL at 18:09

## 2021-02-21 RX ADMIN — LIDOCAINE HYDROCHLORIDE 10 ML: 20 INJECTION, SOLUTION INFILTRATION; PERINEURAL at 17:20

## 2021-02-22 DIAGNOSIS — F41.8 MIXED ANXIETY DEPRESSIVE DISORDER: ICD-10-CM

## 2021-02-22 RX ORDER — VENLAFAXINE 75 MG/1
TABLET ORAL
Qty: 90 TABLET | Refills: 0 | Status: SHIPPED | OUTPATIENT
Start: 2021-02-22 | End: 2021-05-03 | Stop reason: DRUGHIGH

## 2021-02-22 NOTE — ED PROVIDER NOTES
EMERGENCY DEPARTMENT ENCOUNTER      Room Number: 10/10    History is provided by the patient, no translation services needed    HPI:    Chief complaint: Finger injury    Location: Right index finger      Quality/Severity: Aching/1 out of 10    Timing/Duration: Just prior to arrival/constant    Modifying Factors: Worse with palpation    Associated Symptoms: Swelling    Narrative: Pt is a 81 y.o. female who presents complaining of right index finger injury sustained just prior to arrival.  Patient states that she slammed her finger in a car door.  Patient states that she did cut her finger.  Patient denies any numbness or tingling.  Patient denies any other injuries.  Patient states that she is up-to-date with her immunizations.      PMD: Pipo Guillermo DO    REVIEW OF SYSTEMS  Review of Systems   Constitutional: Negative for chills and fever.   Eyes: Negative for pain and visual disturbance.   Respiratory: Negative for cough and shortness of breath.    Cardiovascular: Negative for chest pain and leg swelling.   Gastrointestinal: Negative for abdominal pain, constipation, diarrhea, nausea and vomiting.   Genitourinary: Negative for dysuria and flank pain.   Musculoskeletal: Negative for arthralgias and myalgias.   Skin: Positive for wound. Negative for rash.   Psychiatric/Behavioral: Negative for suicidal ideas. The patient is not nervous/anxious.          PAST MEDICAL HISTORY  Active Ambulatory Problems     Diagnosis Date Noted   • Mixed hyperlipidemia 07/05/2016   • Perennial allergic rhinitis 07/05/2016   • Vitamin D deficiency 07/05/2016   • Mixed anxiety depressive disorder 07/05/2016   • Generalized osteoarthritis 07/05/2016   • Bunion, right 05/10/2017   • Arthritis of right hand 05/10/2017   • Malignant neoplasm of nipple of left breast in female, estrogen receptor positive (CMS/[facility]) 04/19/2018   • Moderate persistent asthma without complication 11/20/2018   • De Quervain's tenosynovitis, left 11/20/2018         Resolved Ambulatory Problems     Diagnosis Date Noted   • No Resolved Ambulatory Problems     Past Medical History:   Diagnosis Date   • Allergic    • Depression    • Hyperlipidemia        PAST SURGICAL HISTORY  Past Surgical History:   Procedure Laterality Date   • COLONOSCOPY N/A 1/20/2017    Procedure: COLONOSCOPY;  Surgeon: Araceli Miranda MD;  Location: [facility] OR;  Service:    • ENDOSCOPY N/A 1/20/2017    Procedure: ESOPHAGOGASTRODUODENOSCOPY WITH BIOPSY;  Surgeon: Araceli Miranda MD;  Location: [facility] OR;  Service:    • HYSTERECTOMY     • TONSILLECTOMY         FAMILY HISTORY  Family History   Problem Relation Age of Onset   • Heart disease Mother    • Heart disease Father        SOCIAL HISTORY  Social History     Socioeconomic History   • Marital status:      Spouse name: Not on file   • Number of children: Not on file   • Years of education: Not on file   • Highest education level: Not on file   Tobacco Use   • Smoking status: Never Smoker   • Smokeless tobacco: Never Used   Substance and Sexual Activity   • Alcohol use: No   • Drug use: No   • Sexual activity: Defer       ALLERGIES  Penicillins    No current facility-administered medications for this encounter.     Current Outpatient Medications:   •  anastrozole (ARIMIDEX) 1 MG tablet, Take  by mouth Daily., Disp: , Rfl:   •  Cholecalciferol (VITAMIN D3) 50 MCG (2000 UT) capsule, Take 2,000 Units by mouth Daily., Disp: , Rfl:   •  clindamycin (CLEOCIN) 300 MG capsule, Take 1 capsule by mouth 3 (Three) Times a Day for 10 days., Disp: 30 capsule, Rfl: 0  •  etodolac (LODINE) 400 MG tablet, TAKE ONE TABLET BY MOUTH DAILY AS NEEDED, Disp: 30 tablet, Rfl: 4  •  ezetimibe-simvastatin (VYTORIN) 10-20 MG per tablet, TAKE ONE TABLET BY MOUTH ONCE NIGHTLY, Disp: 90 tablet, Rfl: 0  •  Fluticasone Furoate-Vilanterol (Breo Ellipta) 100-25 MCG/INH inhaler, Inhale 1 puff Daily., Disp: 1 inhaler, Rfl: 5  •  meclizine (ANTIVERT) 25 MG tablet, Take 1 tablet by mouth  3 (Three) Times a Day As Needed for dizziness., Disp: 30 tablet, Rfl: 0  •  montelukast (SINGULAIR) 10 MG tablet, Take 1 tablet by mouth Every Night., Disp: 90 tablet, Rfl: 1  •  ondansetron (ZOFRAN) 4 MG tablet, Take 1 tablet by mouth Every 8 (Eight) Hours As Needed for Nausea or Vomiting., Disp: 20 tablet, Rfl: 0  •  PROAIR  (90 BASE) MCG/ACT inhaler, INHALE TWO PUFFS BY MOUTH EVERY 4 HOURS AS NEEDED, Disp: 8.5 g, Rfl: 1  •  venlafaxine (EFFEXOR) 75 MG tablet, Take 1 tablet by mouth Daily., Disp: 90 tablet, Rfl: 1    PHYSICAL EXAM  ED Triage Vitals [02/21/21 1610]   Temp Heart Rate Resp BP SpO2   98.5 °F (36.9 °C) 84 16 148/85 99 %      Temp src Heart Rate Source Patient Position BP Location FiO2 (%)   Oral Monitor Sitting Left arm --       Physical Exam   Constitutional: She is oriented to person, place, and time and well-developed, well-nourished, and in no distress.   HENT:   Head: Normocephalic and atraumatic.   Eyes: Conjunctivae and EOM are normal.   Neck: Normal range of motion. Neck supple.   Cardiovascular: Normal rate, regular rhythm and normal heart sounds.   Pulmonary/Chest: Effort normal and breath sounds normal. No respiratory distress.   Abdominal: Soft. Bowel sounds are normal. She exhibits no distension. There is no abdominal tenderness.   Musculoskeletal: Normal range of motion.         General: No edema.        Hands:    Neurological: She is alert and oriented to person, place, and time. GCS score is 15.   Skin: Skin is warm and dry. Laceration noted.   Psychiatric: Mood and affect normal.   Nursing note and vitals reviewed.        LAB RESULTS  Lab Results (last 24 hours)     ** No results found for the last 24 hours. **                RADIOLOGY  Xr Finger 2+ View Right    Result Date: 2/21/2021  CR Fingers Min 2 Vws RT INDICATION: Acute right finger pain. COMPARISON: None available. FINDINGS: 3 views of the right second finger. There is an oblique fracture through the second distal  phalanx. Soft tissues are swollen.     Oblique fracture through the second distal phalanx. Signer Name: Milagros Caballero MD  Signed: 2/21/2021 4:57 PM  Workstation Name: [id number]  [facility] ordered the above radiologic testing and reviewed the results    PROCEDURES  Laceration Repair    Date/Time: 2/21/2021 7:30 PM  Performed by: Vanita Garrison PA-C  Authorized by: Mirza Paz MD     Consent:     Consent obtained:  Verbal    Consent given by:  Patient    Risks discussed:  Infection, pain, retained foreign body and tendon damage    Alternatives discussed:  No treatment and delayed treatment  Anesthesia (see MAR for exact dosages):     Anesthesia method:  Nerve block    Block anesthetic:  Lidocaine 2% w/o epi    Block injection procedure:  Anatomic landmarks identified and introduced needle    Block outcome:  Anesthesia achieved  Laceration details:     Location:  Finger    Finger location:  R index finger    Length (cm):  3  Repair type:     Repair type:  Simple  Pre-procedure details:     Preparation:  Patient was prepped and draped in usual sterile fashion  Exploration:     Hemostasis achieved with:  Tourniquet    Wound exploration: wound explored through full range of motion and entire depth of wound probed and visualized      Wound extent: no foreign bodies/material noted and no tendon damage noted      Contaminated: no    Treatment:     Area cleansed with:  Saline and Hibiclens    Amount of cleaning:  Extensive    Irrigation solution:  Sterile saline    Irrigation method:  Pressure wash    Visualized foreign bodies/material removed: no    Skin repair:     Repair method:  Sutures    Suture size:  5-0    Suture material:  Prolene    Suture technique:  Simple interrupted    Number of sutures:  6  Approximation:     Approximation:  Close  Post-procedure details:     Dressing:  Antibiotic ointment, splint for protection and bulky dressing    Patient tolerance of procedure:   Tolerated well, no immediate complications          PROGRESS AND CONSULTS           MEDICAL DECISION MAKING    MDM       DIAGNOSIS  Final diagnoses:   Open nondisplaced fracture of distal phalanx of right index finger, initial encounter   Laceration of right index finger without damage to nail, foreign body presence unspecified, initial encounter       Latest Documented Vital Signs:  As of 19:35 EST  BP- 148/85 HR- 84 Temp- 98.5 °F (36.9 °C) (Oral) O2 sat- 99%    DISPOSITION  Discharged home        Discussed pertinent labs and imaging findings with the patient/family.  Patient/Family voiced understanding of need to follow-up for recheck, further testing as needed.  Return to the emergency Department warnings were given.         Medication List      New Prescriptions    clindamycin 300 MG capsule  Commonly known as: CLEOCIN  Take 1 capsule by mouth 3 (Three) Times a Day for 10 days.           Where to Get Your Medications      These medications were sent to [address] - [address] [address] - [phone] [facility] [phone]   [address] [address]    Phone: [phone]   · clindamycin 300 MG capsule             Follow-up Information     Pipo Guillermo DO. Call in 1 day.    Specialty: Family Medicine  Why: To schedule a follow up appointment  Contact information:  [address] [facility] [id number] [phone]             Berto Muir MD.    Specialties: Plastic Surgery, Hand Surgery  Contact information:  [address] [facility] [address] [phone]                     Dictated utilizing Dragon dictation     Vanita Garrison PA-C  02/21/21 1937

## 2021-02-22 NOTE — TELEPHONE ENCOUNTER
LS 8-5  Next appointment 5-3  LF 8-5 #901 refill   Last lab 8-7  Next lab 4-28   PHYSICAL THERAPY INITIAL OUTPATIENT EVALUATION    Referring Provider:  Dr. Romaine Lawrence    Diagnosis:       ICD-10-CM ICD-9-CM    1. Trochanteric bursitis of left hip M70.62 726.5    2. Sciatica, left side M54.32 724.3      Orders:  Evaluate and Treat    Date of Initial Evaluation: 3/12/18      Visit # 1     BACKGROUND:  Patient is a 43 y/o female with c/o left hip pain that radiates down her L leg at times. She states that the pain has been hurting on and off for years.  She states that she has increased pain with standing and walking activities.  She states that she has been diagnosed with sciatic nerve damage in 2005.  She has received injections in her L hip before with good results, but she states that the shots are not helping anymore.  The patient reports that her current pain level is a 2/10 but when she walks for a while, it can get to a 10/10.      OBJECTIVE    Gait:  Mild antalgic gait on the L side  Posture/Structure:  Endomorphic body type with decreased muscle tone core strength    L/sp AROM:   % Pain Present (Y/N)   FB 50% limitation Y   RSB 50% limitation Y   LSB 50% limitation Y   BB 50% limitation y       Hip AROM/PROM:  L hip external rotation and internal rotation limited by muscle tightness     Palpation: TTP throughout proximal hip musculature including piriformis, glute med and minimis    Strength:  Strength/Myotome  Right Left   L2 Hip Flexor 4/5 4/5   L3 Quad 4/5 4/5   L4 Hip Abduction 4/5 4/5   L5 ANT TIB 4+/5 4+/5     Neuro/Sensation:   Reflexes  Sensation intact    Other:  Moderate tightness in L piriformis    Function: Patient reports 50% disability based on score of the Lower Extremity Functional Scale.    ASSESSMENT:  The patient is a 44 y.o. year old female who presents to physical therapy with complaints of left hip pain and muscle tightness.  Patient's impairments include decreased flexibility, increased tenderness, and decreased standing tolerance.  These impairments are  limiting patient's ability to perform her ADLs and community activities.  Patient will benefit from skilled physical therapy intervention to decrease her pain, improve her core strength, and increase her activity tolerance.  Her progress towards her goals may be limited by her chronic back pain and endomorphic body type.  Her clinical presentation is stable.  Her evaluation was low in complexity.    Short Term Goals:    1.I with HEP  2.Pt to increase lumbar ROM by 25%.     3. Pt to demonstrate decreased muscle tightness in her L piriformis from moderate to minimal.  4. Pt to have pain less than 3/10 at all times.  5. Pt to score less than 25% impaired on the LEFS.  Long Term Goals:  1.Pt to perform her daily activities including ADLs and community ambulation without limitation due to L hip pain.    TREATMENT PROVIDED:  -Manual Therapy:  15 min (no charge)   - TDN to left piriformis, glute med, glute min   - TPR to left piriformis, glute med  -Modalities:  MH and estim to L hip - 15 min (no charge)  -Evaluation - 25 min  -Education on proper posture, body mechanics and condition    PLAN:  Patient will benefit from physical therapy (2-3) x/week for (4-6) weeks including manual therapy, therapeutic exercise, functional activities, modalities, and patient education.    Thank you for this referral.    These services are reasonable and necessary for the conditions set forth above while under my care.

## 2021-02-27 DIAGNOSIS — J45.40 MODERATE PERSISTENT ASTHMA WITHOUT COMPLICATION: ICD-10-CM

## 2021-03-01 RX ORDER — MONTELUKAST SODIUM 10 MG/1
TABLET ORAL
Qty: 90 TABLET | Refills: 0 | Status: SHIPPED | OUTPATIENT
Start: 2021-03-01 | End: 2021-06-01

## 2021-04-13 DIAGNOSIS — E55.9 VITAMIN D DEFICIENCY: ICD-10-CM

## 2021-04-13 DIAGNOSIS — Z00.00 HEALTHCARE MAINTENANCE: ICD-10-CM

## 2021-04-13 DIAGNOSIS — E78.2 MIXED HYPERLIPIDEMIA: Primary | ICD-10-CM

## 2021-05-03 ENCOUNTER — OFFICE VISIT (OUTPATIENT)
Dept: FAMILY MEDICINE CLINIC | Facility: CLINIC | Age: 81
End: 2021-05-03

## 2021-05-03 VITALS
SYSTOLIC BLOOD PRESSURE: 142 MMHG | HEIGHT: 62 IN | WEIGHT: 161.4 LBS | DIASTOLIC BLOOD PRESSURE: 78 MMHG | HEART RATE: 81 BPM | TEMPERATURE: 96.9 F | BODY MASS INDEX: 29.7 KG/M2 | OXYGEN SATURATION: 98 %

## 2021-05-03 DIAGNOSIS — Z17.0 MALIGNANT NEOPLASM OF NIPPLE OF LEFT BREAST IN FEMALE, ESTROGEN RECEPTOR POSITIVE (HCC): ICD-10-CM

## 2021-05-03 DIAGNOSIS — J45.40 MODERATE PERSISTENT ASTHMA WITHOUT COMPLICATION: ICD-10-CM

## 2021-05-03 DIAGNOSIS — C50.012 MALIGNANT NEOPLASM OF NIPPLE OF LEFT BREAST IN FEMALE, ESTROGEN RECEPTOR POSITIVE (HCC): ICD-10-CM

## 2021-05-03 DIAGNOSIS — M15.9 GENERALIZED OSTEOARTHRITIS: ICD-10-CM

## 2021-05-03 DIAGNOSIS — J30.89 PERENNIAL ALLERGIC RHINITIS: ICD-10-CM

## 2021-05-03 DIAGNOSIS — Z00.00 MEDICARE ANNUAL WELLNESS VISIT, SUBSEQUENT: Primary | ICD-10-CM

## 2021-05-03 DIAGNOSIS — E55.9 VITAMIN D DEFICIENCY: ICD-10-CM

## 2021-05-03 DIAGNOSIS — F41.8 MIXED ANXIETY DEPRESSIVE DISORDER: ICD-10-CM

## 2021-05-03 DIAGNOSIS — E78.2 MIXED HYPERLIPIDEMIA: ICD-10-CM

## 2021-05-03 PROCEDURE — 99214 OFFICE O/P EST MOD 30 MIN: CPT | Performed by: FAMILY MEDICINE

## 2021-05-03 PROCEDURE — G0439 PPPS, SUBSEQ VISIT: HCPCS | Performed by: FAMILY MEDICINE

## 2021-05-03 RX ORDER — VENLAFAXINE HYDROCHLORIDE 150 MG/1
150 CAPSULE, EXTENDED RELEASE ORAL DAILY
Qty: 30 CAPSULE | Refills: 1 | Status: SHIPPED | OUTPATIENT
Start: 2021-05-03 | End: 2021-06-07 | Stop reason: SDUPTHER

## 2021-05-03 RX ORDER — EZETIMIBE AND SIMVASTATIN 10; 20 MG/1; MG/1
1 TABLET ORAL NIGHTLY
Qty: 90 TABLET | Refills: 1 | Status: SHIPPED | OUTPATIENT
Start: 2021-05-03 | End: 2022-01-01

## 2021-05-04 NOTE — PROGRESS NOTES
The ABCs of the Annual Wellness Visit  Subsequent Medicare Wellness Visit    Chief Complaint   Patient presents with   • Medicare Wellness-subsequent       Subjective   History of Present Illness:  Rut Wright is a 81 y.o. female who presents for a Subsequent Medicare Wellness Visit.  81-year-old white female here for further medical management of several medical issues.  Patient is a breast cancer survivor and continues to use anastrozole.  She does use etodolac for arthralgias and Vytorin for hyperlipidemia.  Meclizine is used on a as needed basis for vertigo as well as Singulair for seasonal allergic rhinitis.  Effexor XR is used at 75 mg for depression with anxiety features.  Patient does state that her moods have decreased and she has had little energy, motivation and poor concentration.  She has difficulty sleeping and states that appetite is normal.  She has not been participating in activities of usual enjoyment and states that she has had increased irritability and agitation especially with anger towards her .  Patient denies suicidal or homicidal ideation.    HEALTH RISK ASSESSMENT    Recent Hospitalizations:  No hospitalization(s) within the last year.    Current Medical Providers:  Patient Care Team:  Pipo Guillermo DO as PCP - General    Smoking Status:  Social History     Tobacco Use   Smoking Status Never Smoker   Smokeless Tobacco Never Used       Alcohol Consumption:  Social History     Substance and Sexual Activity   Alcohol Use No       Depression Screen:   PHQ-2/PHQ-9 Depression Screening 5/3/2021   Little interest or pleasure in doing things 0   Feeling down, depressed, or hopeless 0   Total Score 0       Fall Risk Screen:  SHAYEADI Fall Risk Assessment has not been completed.    Health Habits and Functional and Cognitive Screening:  Functional & Cognitive Status 5/3/2021   Do you have difficulty preparing food and eating? -   Do you have difficulty bathing yourself, getting dressed or  grooming yourself? -   Do you have difficulty using the toilet? -   Do you have difficulty moving around from place to place? Yes   Do you have trouble with steps or getting out of a bed or a chair? -   Do you need help using the phone?  -   Are you deaf or do you have serious difficulty hearing?  -   Do you need help with transportation? Yes   Do you need help shopping? Yes   Do you need help preparing meals?  -   Do you need help with housework?  Yes   Do you need help with laundry? Yes   Do you need help taking your medications? -   Do you need help managing money? -   Do you ever drive or ride in a car without wearing a seat belt? -         Does the patient have evidence of cognitive impairment? No    Asprin use counseling:Does not need ASA (and currently is not on it)    Age-appropriate Screening Schedule:  Refer to the list below for future screening recommendations based on patient's age, sex and/or medical conditions. Orders for these recommended tests are listed in the plan section. The patient has been provided with a written plan.    Health Maintenance   Topic Date Due   • ZOSTER VACCINE (1 of 2) Never done   • TDAP/TD VACCINES (2 - Tdap) 01/17/2006   • INFLUENZA VACCINE  08/01/2021   • MAMMOGRAM  01/27/2022   • LIPID PANEL  04/28/2022   • DXA SCAN  06/23/2022   • COLONOSCOPY  01/20/2027          The following portions of the patient's history were reviewed and updated as appropriate: allergies, current medications, past family history, past medical history, past social history, past surgical history and problem list.    Outpatient Medications Prior to Visit   Medication Sig Dispense Refill   • anastrozole (ARIMIDEX) 1 MG tablet Take  by mouth Daily.     • Cholecalciferol (VITAMIN D3) 50 MCG (2000 UT) capsule Take 2,000 Units by mouth Daily.     • etodolac (LODINE) 400 MG tablet TAKE ONE TABLET BY MOUTH DAILY AS NEEDED 30 tablet 4   • Fluticasone Furoate-Vilanterol (Breo Ellipta) 100-25 MCG/INH inhaler  Inhale 1 puff Daily. 1 inhaler 5   • meclizine (ANTIVERT) 25 MG tablet Take 1 tablet by mouth 3 (Three) Times a Day As Needed for dizziness. 30 tablet 0   • montelukast (SINGULAIR) 10 MG tablet TAKE ONE TABLET BY MOUTH ONCE NIGHTLY 90 tablet 0   • ondansetron (ZOFRAN) 4 MG tablet Take 1 tablet by mouth Every 8 (Eight) Hours As Needed for Nausea or Vomiting. 20 tablet 0   • PROAIR  (90 BASE) MCG/ACT inhaler INHALE TWO PUFFS BY MOUTH EVERY 4 HOURS AS NEEDED 8.5 g 1   • ezetimibe-simvastatin (VYTORIN) 10-20 MG per tablet TAKE ONE TABLET BY MOUTH ONCE NIGHTLY 90 tablet 0   • venlafaxine (EFFEXOR) 75 MG tablet TAKE ONE TABLET BY MOUTH DAILY 90 tablet 0     No facility-administered medications prior to visit.       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 Patient does not have an advance directive, information provided.    Review of Systems   Respiratory:        Breast cancer, mild intermittent uncomplicated asthma   Cardiovascular:        Hyperlipidemia   Allergic/Immunologic: Positive for environmental allergies.   Psychiatric/Behavioral: Positive for dysphoric mood and sleep disturbance. The patient is nervous/anxious.    All other systems reviewed and are negative.      Compared to one year ago, the patient feels her physical health is worse.  Compared to one year ago, the patient feels her mental health is worse.    Reviewed chart for potential of high risk medication in the elderly: not applicable  Reviewed chart for potential of harmful drug  "interactions in the elderly:not applicable    Objective         Vitals:    05/03/21 1347   BP: 142/78   Pulse: 81   Temp: 96.9 °F (36.1 °C)   SpO2: 98%   Weight: 73.2 kg (161 lb 6.4 oz)   Height: 157.5 cm (62.01\")       Body mass index is 29.51 kg/m².  Discussed the patient's BMI with her. The BMI is in the acceptable range.    Physical Exam  Vitals and nursing note reviewed.   Constitutional:       Appearance: Normal appearance. She is well-developed and well-groomed.   HENT:      Head: Normocephalic and atraumatic.   Neck:      Thyroid: No thyroid mass or thyromegaly.      Vascular: Normal carotid pulses. No carotid bruit.      Trachea: Trachea and phonation normal.   Cardiovascular:      Rate and Rhythm: Normal rate and regular rhythm.      Heart sounds: Normal heart sounds. No murmur heard.   No friction rub. No gallop.    Pulmonary:      Effort: Pulmonary effort is normal. No respiratory distress.      Breath sounds: Normal breath sounds. No decreased breath sounds, wheezing, rhonchi or rales.   Musculoskeletal:      Cervical back: Neck supple.   Lymphadenopathy:      Cervical: No cervical adenopathy.   Skin:     General: Skin is warm and dry.      Findings: No rash.   Neurological:      Mental Status: She is alert and oriented to person, place, and time.   Psychiatric:         Attention and Perception: Attention and perception normal.         Mood and Affect: Affect normal. Mood is depressed.         Speech: Speech normal.         Behavior: Behavior normal. Behavior is cooperative.         Thought Content: Thought content normal.         Cognition and Memory: Cognition and memory normal.         Judgment: Judgment normal.       Results Encounter on 04/20/2021   Component Date Value Ref Range Status   • Total Cholesterol 04/28/2021 170  0 - 200 mg/dL Final    Comment: Cholesterol Reference Ranges  (U.S. Department of Health and Human Services ATP III  Classifications)  Desirable          <200 mg/dL  Borderline " High    200-239 mg/dL  High Risk          >240 mg/dL  Triglyceride Reference Ranges  (U.S. Department of Health and Human Services ATP III  Classifications)  Normal           <150 mg/dL  Borderline High  150-199 mg/dL  High             200-499 mg/dL  Very High        >500 mg/dL  HDL Reference Ranges  (U.S. Department of Health and Human Services ATP III  Classifcations)  Low     <40 mg/dl (major risk factor for CHD)  High    >60 mg/dl ('negative' risk factor for CHD)  LDL Reference Ranges  (U.S. Department of Health and Human Services ATP III  Classifcations)  Optimal          <100 mg/dL  Near Optimal     100-129 mg/dL  Borderline High  130-159 mg/dL  High             160-189 mg/dL  Very High        >189 mg/dL     • Triglycerides 04/28/2021 168* 0 - 150 mg/dL Final   • HDL Cholesterol 04/28/2021 75* 40 - 60 mg/dL Final   • VLDL Cholesterol Terell 04/28/2021 28  5 - 40 mg/dL Final   • LDL Chol Calc ([facility]) 04/28/2021 67  0 - 100 mg/dL Final   • LDL/HDL RATIO 04/28/2021 0.82   Final   • WBC 04/28/2021 5.59  3.40 - 10.80 10*3/mm3 Final   • RBC 04/28/2021 4.83  3.77 - 5.28 10*6/mm3 Final   • Hemoglobin 04/28/2021 13.9  12.0 - 15.9 g/dL Final   • Hematocrit 04/28/2021 42.4  34.0 - 46.6 % Final   • MCV 04/28/2021 87.8  79.0 - 97.0 fL Final   • MCH 04/28/2021 28.8  26.6 - 33.0 pg Final   • MCHC 04/28/2021 32.8  31.5 - 35.7 g/dL Final   • RDW 04/28/2021 13.3  12.3 - 15.4 % Final   • Platelets 04/28/2021 276  140 - 450 10*3/mm3 Final   • Neutrophil Rel % 04/28/2021 44.9  42.7 - 76.0 % Final   • Lymphocyte Rel % 04/28/2021 35.6  19.6 - 45.3 % Final   • Monocyte Rel % 04/28/2021 9.5  5.0 - 12.0 % Final   • Eosinophil Rel % 04/28/2021 8.4* 0.3 - 6.2 % Final   • Basophil Rel % 04/28/2021 1.4  0.0 - 1.5 % Final   • Neutrophils Absolute 04/28/2021 2.51  1.70 - 7.00 10*3/mm3 Final   • Lymphocytes Absolute 04/28/2021 1.99  0.70 - 3.10 10*3/mm3 Final   • Monocytes Absolute 04/28/2021 0.53  0.10 - 0.90 10*3/mm3 Final   • Eosinophils  Absolute 04/28/2021 0.47* 0.00 - 0.40 10*3/mm3 Final   • Basophils Absolute 04/28/2021 0.08  0.00 - 0.20 10*3/mm3 Final   • Immature Granulocyte Rel % 04/28/2021 0.2  0.0 - 0.5 % Final   • Immature Grans Absolute 04/28/2021 0.01  0.00 - 0.05 10*3/mm3 Final   • nRBC 04/28/2021 0.0  0.0 - 0.2 /100 WBC Final   • Glucose 04/28/2021 96  65 - 99 mg/dL Final   • BUN 04/28/2021 14  8 - 23 mg/dL Final   • Creatinine 04/28/2021 0.79  0.57 - 1.00 mg/dL Final   • eGFR Non  Am 04/28/2021 70  >60 mL/min/1.73 Final    Comment: The MDRD GFR formula is only valid for adults with stable  renal function between ages 18 and 70.     • eGFR  Am 04/28/2021 85  >60 mL/min/1.73 Final   • BUN/Creatinine Ratio 04/28/2021 17.7  7.0 - 25.0 Final   • Sodium 04/28/2021 139  136 - 145 mmol/L Final   • Potassium 04/28/2021 4.3  3.5 - 5.2 mmol/L Final   • Chloride 04/28/2021 104  98 - 107 mmol/L Final   • Total CO2 04/28/2021 24.9  22.0 - 29.0 mmol/L Final   • Calcium 04/28/2021 10.0  8.6 - 10.5 mg/dL Final   • Total Protein 04/28/2021 6.4  6.0 - 8.5 g/dL Final   • Albumin 04/28/2021 4.30  3.50 - 5.20 g/dL Final   • Globulin 04/28/2021 2.1  gm/dL Final   • A/G Ratio 04/28/2021 2.0  g/dL Final   • Total Bilirubin 04/28/2021 0.5  0.0 - 1.2 mg/dL Final   • Alkaline Phosphatase 04/28/2021 91  39 - 117 U/L Final   • AST (SGOT) 04/28/2021 16  1 - 32 U/L Final   • ALT (SGPT) 04/28/2021 12  1 - 33 U/L Final   • TSH 04/28/2021 4.160  0.270 - 4.200 uIU/mL Final   • 25 Hydroxy, Vitamin D 04/28/2021 64.0  30.0 - 100.0 ng/ml Final    Comment: Results may be falsely increased if patient taking Biotin.  Reference Range for Total Vitamin D 25(OH)  Deficiency <20.0 ng/mL  Insufficiency 21-29 ng/mL  Sufficiency  ng/mL  Toxicity >100 ng/ml           Lab Results   Component Value Date    GLU 96 04/28/2021    CHLPL 170 04/28/2021    TRIG 168 (H) 04/28/2021    HDL 75 (H) 04/28/2021    LDL 67 04/28/2021    VLDL 28 04/28/2021        Assessment/Plan    Medicare Risks and Personalized Health Plan  CMS Preventative Services Quick Reference  Advance Directive Discussion  Cardiovascular risk  Chronic Pain   Dementia/Memory   Depression/Dysphoria  Diabetic Lab Screening   Fall Risk  Hearing Problem  Immunizations Discussed/Encouraged (specific immunizations; Tdap, Influenza and Shingrix )  Inactivity/Sedentary  Osteoporosis Risk  Polypharmacy    The above risks/problems have been discussed with the patient.  Pertinent information has been shared with the patient in the After Visit Summary.  Follow up plans and orders are seen below in the Assessment/Plan Section.    Diagnoses and all orders for this visit:    1. Medicare annual wellness visit, subsequent (Primary)    2. Mixed hyperlipidemia  -     ezetimibe-simvastatin (VYTORIN) 10-20 MG per tablet; Take 1 tablet by mouth Every Night.  Dispense: 90 tablet; Refill: 1    3. Vitamin D deficiency    4. Perennial allergic rhinitis    5. Malignant neoplasm of nipple of left breast in female, estrogen receptor positive (CMS/[facility])    6. Mixed anxiety depressive disorder  -     venlafaxine XR (Effexor XR) 150 MG 24 hr capsule; Take 1 capsule by mouth Daily.  Dispense: 30 capsule; Refill: 1    7. Generalized osteoarthritis    8. Moderate persistent asthma without complication    20 minutes of a 30-minute appointment spent counseling in regards to the diagnosis and treatment of depression with anxiety features.  Effexor XR will be increased from 75 mg daily to 150 mg daily.  Patient will be asked to be seen in this office in 4 weeks.  Follow Up:  Return in about 4 weeks (around 5/31/2021) for Recheck.     An After Visit Summary and PPPS were given to the patient.

## 2021-05-30 DIAGNOSIS — J45.40 MODERATE PERSISTENT ASTHMA WITHOUT COMPLICATION: ICD-10-CM

## 2021-05-30 DIAGNOSIS — F41.8 MIXED ANXIETY DEPRESSIVE DISORDER: ICD-10-CM

## 2021-06-01 RX ORDER — VENLAFAXINE 75 MG/1
TABLET ORAL
Qty: 90 TABLET | Refills: 1 | Status: SHIPPED | OUTPATIENT
Start: 2021-06-01 | End: 2021-06-07 | Stop reason: DRUGHIGH

## 2021-06-01 RX ORDER — MONTELUKAST SODIUM 10 MG/1
TABLET ORAL
Qty: 90 TABLET | Refills: 1 | Status: SHIPPED | OUTPATIENT
Start: 2021-06-01 | End: 2021-01-01

## 2021-06-07 ENCOUNTER — OFFICE VISIT (OUTPATIENT)
Dept: FAMILY MEDICINE CLINIC | Facility: CLINIC | Age: 81
End: 2021-06-07

## 2021-06-07 VITALS
SYSTOLIC BLOOD PRESSURE: 124 MMHG | BODY MASS INDEX: 29.26 KG/M2 | OXYGEN SATURATION: 97 % | DIASTOLIC BLOOD PRESSURE: 76 MMHG | WEIGHT: 159 LBS | HEIGHT: 62 IN | TEMPERATURE: 97.3 F | HEART RATE: 72 BPM

## 2021-06-07 DIAGNOSIS — F41.8 MIXED ANXIETY DEPRESSIVE DISORDER: Primary | ICD-10-CM

## 2021-06-07 PROCEDURE — 99213 OFFICE O/P EST LOW 20 MIN: CPT | Performed by: FAMILY MEDICINE

## 2021-06-07 RX ORDER — VENLAFAXINE HYDROCHLORIDE 150 MG/1
150 CAPSULE, EXTENDED RELEASE ORAL DAILY
Qty: 30 CAPSULE | Refills: 1 | Status: SHIPPED | OUTPATIENT
Start: 2021-06-07 | End: 2021-08-16

## 2021-06-07 RX ORDER — BUPROPION HYDROCHLORIDE 150 MG/1
150 TABLET ORAL DAILY
Qty: 30 TABLET | Refills: 1 | Status: SHIPPED | OUTPATIENT
Start: 2021-06-07 | End: 2021-07-08 | Stop reason: DRUGHIGH

## 2021-06-08 NOTE — PROGRESS NOTES
Subjective   Rut Wright is a 81 y.o. female with   Chief Complaint   Patient presents with   • Depression     follow up on med change, increase in Effexor.  Just doesnt feel any different   .    History of Present Illness   81-year-old white female with ongoing depression.  Patient was seen 4 weeks ago and Effexor XR was increased from 75 mg daily to 150 mg daily.  She states that her anxiety is well controlled but continues to have decreased energy, motivation and poor concentration.  She has not been participating in activities of usual enjoyment.  Patient denies easy agitation or increased irritability.  She does admit to sleeping well and has a normal appetite.  Patient denies suicidal or homicidal ideation.  The following portions of the patient's history were reviewed and updated as appropriate: allergies, current medications, past family history, past medical history, past social history, past surgical history and problem list.    Review of Systems   Psychiatric/Behavioral: Positive for dysphoric mood. Negative for agitation, self-injury, sleep disturbance and suicidal ideas. The patient is nervous/anxious.        Objective     Vitals:    06/07/21 1443   BP: 124/76   Pulse: 72   Temp: 97.3 °F (36.3 °C)   SpO2: 97%       No results found for this or any previous visit (from the past 672 hour(s)).    Physical Exam  Vitals and nursing note reviewed.   Constitutional:       Appearance: Normal appearance. She is well-developed and well-groomed.   HENT:      Head: Normocephalic and atraumatic.   Musculoskeletal:      Cervical back: Neck supple.   Skin:     General: Skin is warm and dry.      Findings: No rash.   Neurological:      Mental Status: She is alert and oriented to person, place, and time.   Psychiatric:         Attention and Perception: Attention and perception normal.         Mood and Affect: Mood and affect normal.         Speech: Speech normal.         Behavior: Behavior normal. Behavior is  cooperative.         Thought Content: Thought content normal.         Cognition and Memory: Cognition and memory normal.         Judgment: Judgment normal.         Assessment/Plan   Diagnoses and all orders for this visit:    1. Mixed anxiety depressive disorder (Primary)  -     venlafaxine XR (Effexor XR) 150 MG 24 hr capsule; Take 1 capsule by mouth Daily.  Dispense: 30 capsule; Refill: 1  -     buPROPion XL (Wellbutrin XL) 150 MG 24 hr tablet; Take 1 tablet by mouth Daily.  Dispense: 30 tablet; Refill: 1        Return in about 4 weeks (around 7/5/2021) for Recheck.

## 2021-07-06 ENCOUNTER — OFFICE VISIT (OUTPATIENT)
Dept: FAMILY MEDICINE CLINIC | Facility: CLINIC | Age: 81
End: 2021-07-06

## 2021-07-06 VITALS
HEIGHT: 62 IN | BODY MASS INDEX: 27.97 KG/M2 | OXYGEN SATURATION: 97 % | HEART RATE: 100 BPM | TEMPERATURE: 97.8 F | DIASTOLIC BLOOD PRESSURE: 80 MMHG | WEIGHT: 152 LBS | SYSTOLIC BLOOD PRESSURE: 142 MMHG

## 2021-07-06 DIAGNOSIS — F41.8 MIXED ANXIETY DEPRESSIVE DISORDER: Primary | ICD-10-CM

## 2021-07-06 PROCEDURE — 99213 OFFICE O/P EST LOW 20 MIN: CPT | Performed by: FAMILY MEDICINE

## 2021-07-08 RX ORDER — BUPROPION HYDROCHLORIDE 300 MG/1
300 TABLET ORAL DAILY
Qty: 30 TABLET | Refills: 1 | Status: SHIPPED | OUTPATIENT
Start: 2021-07-08 | End: 2022-01-01 | Stop reason: SDUPTHER

## 2021-07-08 NOTE — PROGRESS NOTES
Subjective   Rut Wright is a 81 y.o. female with   Chief Complaint   Patient presents with   • Depression   .    History of Present Illness   81-year-old white female here in follow-up for depression with anxiety features.  Patient was seen 4 weeks ago with the addition of Wellbutrin XL at 150 mg daily to her medication regimen.  She is also using Effexor XR at 150 mg daily.  All medication is well-tolerated and there have been no side effects.  She is using these medications on a regular basis.  She continues to have decreased moods although anxiety is well stabilized.  She admits to poor energy, poor concentration with little motivation.  She does sleep well and has a normal appetite.  Patient denies easy agitation or increased irritability but also admits to not participating in activities of usual enjoyment.  Patient denies suicidal or homicidal ideation.  The following portions of the patient's history were reviewed and updated as appropriate: allergies, current medications, past family history, past medical history, past social history, past surgical history and problem list.    Review of Systems   Psychiatric/Behavioral: Positive for dysphoric mood. The patient is nervous/anxious.        Objective     Vitals:    07/06/21 1437   BP: 142/80   Pulse: 100   Temp: 97.8 °F (36.6 °C)   SpO2: 97%       No results found for this or any previous visit (from the past 672 hour(s)).    Physical Exam  Vitals and nursing note reviewed.   Constitutional:       Appearance: Normal appearance. She is well-developed and well-groomed.   HENT:      Head: Normocephalic and atraumatic.   Musculoskeletal:      Cervical back: Neck supple.   Skin:     General: Skin is warm and dry.      Findings: No rash.   Neurological:      Mental Status: She is alert and oriented to person, place, and time.   Psychiatric:         Attention and Perception: Attention and perception normal.         Mood and Affect: Mood and affect normal.          Speech: Speech normal.         Behavior: Behavior normal. Behavior is cooperative.         Thought Content: Thought content normal.         Cognition and Memory: Cognition and memory normal.         Judgment: Judgment normal.         Assessment/Plan   Diagnoses and all orders for this visit:    1. Mixed anxiety depressive disorder (Primary)  -     buPROPion XL (Wellbutrin XL) 300 MG 24 hr tablet; Take 1 tablet by mouth Daily.  Dispense: 30 tablet; Refill: 1        Return in about 4 weeks (around 8/3/2021) for Recheck.

## 2021-07-26 ENCOUNTER — OFFICE VISIT (OUTPATIENT)
Dept: FAMILY MEDICINE CLINIC | Facility: CLINIC | Age: 81
End: 2021-07-26

## 2021-07-26 VITALS
HEART RATE: 98 BPM | BODY MASS INDEX: 27.42 KG/M2 | TEMPERATURE: 97.9 F | DIASTOLIC BLOOD PRESSURE: 74 MMHG | SYSTOLIC BLOOD PRESSURE: 138 MMHG | HEIGHT: 62 IN | OXYGEN SATURATION: 98 % | RESPIRATION RATE: 16 BRPM | WEIGHT: 149 LBS

## 2021-07-26 DIAGNOSIS — R27.0 ATAXIA: ICD-10-CM

## 2021-07-26 DIAGNOSIS — R11.2 NAUSEA AND VOMITING, INTRACTABILITY OF VOMITING NOT SPECIFIED, UNSPECIFIED VOMITING TYPE: Primary | ICD-10-CM

## 2021-07-26 DIAGNOSIS — R42 VERTIGO: ICD-10-CM

## 2021-07-26 DIAGNOSIS — R10.32 LEFT LOWER QUADRANT ABDOMINAL PAIN: ICD-10-CM

## 2021-07-26 PROCEDURE — 99214 OFFICE O/P EST MOD 30 MIN: CPT | Performed by: NURSE PRACTITIONER

## 2021-07-26 RX ORDER — MECLIZINE HYDROCHLORIDE 25 MG/1
25 TABLET ORAL 3 TIMES DAILY PRN
Qty: 30 TABLET | Refills: 0 | Status: SHIPPED | OUTPATIENT
Start: 2021-07-26 | End: 2021-11-23

## 2021-07-26 RX ORDER — ONDANSETRON 4 MG/1
4 TABLET, FILM COATED ORAL EVERY 8 HOURS PRN
Qty: 30 TABLET | Refills: 0 | Status: SHIPPED | OUTPATIENT
Start: 2021-07-26

## 2021-07-26 NOTE — PROGRESS NOTES
"Subjective      Chief Complaint   Patient presents with   • Nausea   • Diarrhea       Rut Wright is a 81 y.o. female who presents along with daughter for evaluation of bilious vomiting 2 times per day, diarrhea 2 times per day and nausea. Symptoms have been present for 6 weeks. Patient denies blood in stool, constipation, dysuria, fever, heartburn and melena. Patient's oral intake has been normal. Patient's urine output has been decreased with unknown voids in 24 hours, the last void was incontinent of urine hours ago. Other contacts with similar symptoms include: . Patient denies recent travel history. Patient has not had recent ingestion of possible contaminated food, toxic plants, or inappropriate medications/poisons.   Weight loss of 10 pounds since 6/7/2021.      The following portions of the patient's history were reviewed and updated as appropriate: allergies, current medications, past family history, past medical history, past social history, past surgical history and problem list.     Objective   /74 (BP Location: Left arm, Patient Position: Sitting, Cuff Size: Adult)   Pulse 98   Temp 97.9 °F (36.6 °C)   Resp 16   Ht 157.5 cm (62\")   Wt 67.6 kg (149 lb)   SpO2 98%   BMI 27.25 kg/m²   General appearance: alert, appears stated age and cooperative  Head: Normocephalic, without obvious abnormality, atraumatic  Throat: lips, mucosa, and tongue normal; teeth and gums normal  Lungs: clear to auscultation bilaterally  Heart: regular rate and rhythm, S1, S2 normal, no murmur, click, rub or gallop  Abdomen: soft, non-tender; bowel sounds normal; no masses,  no organomegaly.  Slight tenderness to LLQ on exam today.  Skin: Skin color, texture, turgor normal. No rashes or lesions     Assessment/Plan   Acute Gastroenteritis     Diagnosis Plan   1. Nausea and vomiting, intractability of vomiting not specified, unspecified vomiting type  CBC & Differential    Comprehensive Metabolic Panel    UA / M " With / Rflx Culture([facility] ONLY) - Urine, Clean Catch    Amylase    Lipase    ondansetron (Zofran) 4 MG tablet   2. Vertigo  CBC & Differential    Comprehensive Metabolic Panel    UA / M With / Rflx Culture([facility] ONLY) - Urine, Clean Catch    meclizine (ANTIVERT) 25 MG tablet   3. Ataxia  meclizine (ANTIVERT) 25 MG tablet   4. Left lower quadrant abdominal pain  CBC & Differential    Comprehensive Metabolic Panel    UA / M With / Rflx Culture([facility] ONLY) - Urine, Clean Catch    Amylase    Lipase       1. Discussed oral rehydration, reintroduction of solid foods, signs of dehydration.  2. Return or go to emergency department if worsening symptoms, blood or bile, signs of dehydration, diarrhea lasting longer than 5 days or any new concerns.  Labs today which will determine further recommendations.    3. Follow up in 1 week or sooner as needed.    Patient was wearing face mask when I entered the room and throughout our encounter. Protective equipment was worn throughout this patient encounter including a face mask, eye protection, and gloves. Hand hygiene was performed before donning protective equipment and after removal when leaving the room.     Loraine Castillo, APRN

## 2021-07-27 LAB
ALBUMIN SERPL-MCNC: 3.9 G/DL (ref 3.7–4.7)
ALBUMIN/GLOB SERPL: 2 {RATIO} (ref 1.2–2.2)
ALP SERPL-CCNC: 83 IU/L (ref 48–121)
ALT SERPL-CCNC: 16 IU/L (ref 0–32)
AMYLASE SERPL-CCNC: 34 U/L (ref 31–110)
AST SERPL-CCNC: 19 IU/L (ref 0–40)
BASOPHILS # BLD AUTO: 0 X10E3/UL (ref 0–0.2)
BASOPHILS NFR BLD AUTO: 1 %
BILIRUB SERPL-MCNC: 0.5 MG/DL (ref 0–1.2)
BUN SERPL-MCNC: 13 MG/DL (ref 8–27)
BUN/CREAT SERPL: 16 (ref 12–28)
CALCIUM SERPL-MCNC: 9.3 MG/DL (ref 8.7–10.3)
CHLORIDE SERPL-SCNC: 102 MMOL/L (ref 96–106)
CO2 SERPL-SCNC: 21 MMOL/L (ref 20–29)
CREAT SERPL-MCNC: 0.8 MG/DL (ref 0.57–1)
EOSINOPHIL # BLD AUTO: 0.2 X10E3/UL (ref 0–0.4)
EOSINOPHIL NFR BLD AUTO: 3 %
ERYTHROCYTE [DISTWIDTH] IN BLOOD BY AUTOMATED COUNT: 13.4 % (ref 11.7–15.4)
GLOBULIN SER CALC-MCNC: 2 G/DL (ref 1.5–4.5)
GLUCOSE SERPL-MCNC: 89 MG/DL (ref 65–99)
HCT VFR BLD AUTO: 41.3 % (ref 34–46.6)
HGB BLD-MCNC: 13.8 G/DL (ref 11.1–15.9)
IMM GRANULOCYTES # BLD AUTO: 0 X10E3/UL (ref 0–0.1)
IMM GRANULOCYTES NFR BLD AUTO: 0 %
LIPASE SERPL-CCNC: 32 U/L (ref 14–85)
LYMPHOCYTES # BLD AUTO: 2 X10E3/UL (ref 0.7–3.1)
LYMPHOCYTES NFR BLD AUTO: 30 %
MCH RBC QN AUTO: 28.9 PG (ref 26.6–33)
MCHC RBC AUTO-ENTMCNC: 33.4 G/DL (ref 31.5–35.7)
MCV RBC AUTO: 87 FL (ref 79–97)
MONOCYTES # BLD AUTO: 0.7 X10E3/UL (ref 0.1–0.9)
MONOCYTES NFR BLD AUTO: 10 %
NEUTROPHILS # BLD AUTO: 3.6 X10E3/UL (ref 1.4–7)
NEUTROPHILS NFR BLD AUTO: 56 %
PLATELET # BLD AUTO: 259 X10E3/UL (ref 150–450)
POTASSIUM SERPL-SCNC: 4 MMOL/L (ref 3.5–5.2)
PROT SERPL-MCNC: 5.9 G/DL (ref 6–8.5)
RBC # BLD AUTO: 4.77 X10E6/UL (ref 3.77–5.28)
SODIUM SERPL-SCNC: 137 MMOL/L (ref 134–144)
UNABLE TO VOID: NORMAL
WBC # BLD AUTO: 6.5 X10E3/UL (ref 3.4–10.8)

## 2021-07-29 DIAGNOSIS — R11.2 NAUSEA AND VOMITING, INTRACTABILITY OF VOMITING NOT SPECIFIED, UNSPECIFIED VOMITING TYPE: Primary | ICD-10-CM

## 2021-07-29 DIAGNOSIS — R10.32 LEFT LOWER QUADRANT ABDOMINAL PAIN: ICD-10-CM

## 2021-07-31 LAB
APPEARANCE UR: CLEAR
BACTERIA #/AREA URNS HPF: NORMAL /HPF
BACTERIA UR CULT: ABNORMAL
BACTERIA UR CULT: ABNORMAL
BILIRUB UR QL STRIP: NEGATIVE
CASTS URNS QL MICRO: NORMAL /LPF
COLOR UR: YELLOW
EPI CELLS #/AREA URNS HPF: NORMAL /HPF (ref 0–10)
GLUCOSE UR QL: NEGATIVE
HGB UR QL STRIP: NEGATIVE
KETONES UR QL STRIP: NEGATIVE
LEUKOCYTE ESTERASE UR QL STRIP: ABNORMAL
MICRO URNS: ABNORMAL
NITRITE UR QL STRIP: NEGATIVE
OTHER ANTIBIOTIC SUSC ISLT: ABNORMAL
PH UR STRIP: 6.5 [PH] (ref 5–7.5)
PROT UR QL STRIP: NEGATIVE
RBC #/AREA URNS HPF: NORMAL /HPF (ref 0–2)
SP GR UR: 1.01 (ref 1–1.03)
URINALYSIS REFLEX: ABNORMAL
UROBILINOGEN UR STRIP-MCNC: 0.2 MG/DL (ref 0.2–1)
WBC #/AREA URNS HPF: NORMAL /HPF (ref 0–5)

## 2021-08-02 RX ORDER — CIPROFLOXACIN 500 MG/1
500 TABLET, FILM COATED ORAL 2 TIMES DAILY
Qty: 14 TABLET | Refills: 0 | Status: SHIPPED | OUTPATIENT
Start: 2021-08-02 | End: 2021-08-09

## 2021-08-16 DIAGNOSIS — F41.8 MIXED ANXIETY DEPRESSIVE DISORDER: ICD-10-CM

## 2021-08-16 DIAGNOSIS — M15.9 GENERALIZED OSTEOARTHRITIS: ICD-10-CM

## 2021-08-16 RX ORDER — ETODOLAC 400 MG/1
TABLET, FILM COATED ORAL
Qty: 30 TABLET | Refills: 3 | Status: SHIPPED | OUTPATIENT
Start: 2021-08-16 | End: 2022-01-01

## 2021-08-16 RX ORDER — VENLAFAXINE HYDROCHLORIDE 150 MG/1
CAPSULE, EXTENDED RELEASE ORAL
Qty: 30 CAPSULE | Refills: 3 | Status: SHIPPED | OUTPATIENT
Start: 2021-08-16 | End: 2022-01-01

## 2021-09-12 DIAGNOSIS — F41.8 MIXED ANXIETY DEPRESSIVE DISORDER: ICD-10-CM

## 2021-09-13 RX ORDER — BUPROPION HYDROCHLORIDE 150 MG/1
TABLET ORAL
Qty: 30 TABLET | Refills: 1 | OUTPATIENT
Start: 2021-09-13

## 2021-09-28 DIAGNOSIS — F41.8 MIXED ANXIETY DEPRESSIVE DISORDER: ICD-10-CM

## 2021-09-28 RX ORDER — BUPROPION HYDROCHLORIDE 150 MG/1
TABLET ORAL
Qty: 30 TABLET | Refills: 1 | OUTPATIENT
Start: 2021-09-28

## 2021-10-28 ENCOUNTER — HOSPITAL ENCOUNTER (EMERGENCY)
Facility: HOSPITAL | Age: 81
Discharge: HOME | End: 2021-10-28
Attending: EMERGENCY MEDICINE | Admitting: EMERGENCY MEDICINE

## 2021-10-28 ENCOUNTER — APPOINTMENT (OUTPATIENT)
Dept: CT IMAGING | Facility: HOSPITAL | Age: 81
End: 2021-10-28

## 2021-10-28 ENCOUNTER — APPOINTMENT (OUTPATIENT)
Dept: GENERAL RADIOLOGY | Facility: HOSPITAL | Age: 81
End: 2021-10-28

## 2021-10-28 VITALS
BODY MASS INDEX: 27.14 KG/M2 | OXYGEN SATURATION: 98 % | TEMPERATURE: 98.4 F | HEIGHT: 62 IN | SYSTOLIC BLOOD PRESSURE: 148 MMHG | RESPIRATION RATE: 18 BRPM | DIASTOLIC BLOOD PRESSURE: 78 MMHG | WEIGHT: 147.5 LBS | HEART RATE: 79 BPM
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
## 2021-10-28 DIAGNOSIS — S01.81XA LACERATION OF FACE WITHOUT COMPLICATION, INITIAL ENCOUNTER: ICD-10-CM

## 2021-10-28 PROCEDURE — 90471 IMMUNIZATION ADMIN: CPT | Performed by: PHYSICIAN ASSISTANT

## 2021-10-28 PROCEDURE — 25010000002 TETANUS-DIPHTH-ACELL PERTUSSIS 5-2.5-18.5 LF-MCG/0.5 SUSPENSION PREFILLED SYRINGE: Performed by: PHYSICIAN ASSISTANT

## 2021-10-28 PROCEDURE — 70450 CT HEAD/BRAIN W/O DYE: CPT

## 2021-10-28 PROCEDURE — 90715 TDAP VACCINE 7 YRS/> IM: CPT | Performed by: PHYSICIAN ASSISTANT

## 2021-10-28 PROCEDURE — 73502 X-RAY EXAM HIP UNI 2-3 VIEWS: CPT

## 2021-10-28 PROCEDURE — 12011 RPR F/E/E/N/L/M 2.5 CM/<: CPT | Performed by: PHYSICIAN ASSISTANT

## 2021-10-28 PROCEDURE — 99283 EMERGENCY DEPT VISIT LOW MDM: CPT

## 2021-10-28 RX ORDER — LIDOCAINE HYDROCHLORIDE AND EPINEPHRINE 10; 10 MG/ML; UG/ML
10 INJECTION, SOLUTION INFILTRATION; PERINEURAL ONCE
Status: COMPLETED | OUTPATIENT
Start: 2021-10-28 | End: 2021-10-28

## 2021-10-28 RX ORDER — CHOLECALCIFEROL (VITAMIN D3) 125 MCG
5 CAPSULE ORAL
COMMUNITY

## 2021-10-28 RX ADMIN — TETANUS TOXOID, REDUCED DIPHTHERIA TOXOID AND ACELLULAR PERTUSSIS VACCINE, ADSORBED 0.5 ML: 5; 2.5; 8; 8; 2.5 SUSPENSION INTRAMUSCULAR at 16:04

## 2021-10-28 RX ADMIN — LIDOCAINE HYDROCHLORIDE,EPINEPHRINE BITARTRATE 10 ML: 10; .01 INJECTION, SOLUTION INFILTRATION; PERINEURAL at 16:03

## 2021-11-03 ENCOUNTER — OFFICE VISIT (OUTPATIENT)
Dept: FAMILY MEDICINE CLINIC | Facility: CLINIC | Age: 81
End: 2021-11-03

## 2021-11-03 VITALS
WEIGHT: 141 LBS | OXYGEN SATURATION: 92 % | HEART RATE: 105 BPM | BODY MASS INDEX: 25.95 KG/M2 | SYSTOLIC BLOOD PRESSURE: 142 MMHG | HEIGHT: 62 IN | TEMPERATURE: 97.1 F | DIASTOLIC BLOOD PRESSURE: 78 MMHG

## 2021-11-03 DIAGNOSIS — R53.83 FATIGUE, UNSPECIFIED TYPE: ICD-10-CM

## 2021-11-03 DIAGNOSIS — F41.8 MIXED ANXIETY DEPRESSIVE DISORDER: ICD-10-CM

## 2021-11-03 DIAGNOSIS — E78.2 MIXED HYPERLIPIDEMIA: ICD-10-CM

## 2021-11-03 DIAGNOSIS — E55.9 VITAMIN D DEFICIENCY: ICD-10-CM

## 2021-11-03 DIAGNOSIS — S01.81XD FACIAL LACERATION, SUBSEQUENT ENCOUNTER: Primary | ICD-10-CM

## 2021-11-03 PROCEDURE — 99213 OFFICE O/P EST LOW 20 MIN: CPT | Performed by: FAMILY MEDICINE

## 2021-11-03 RX ORDER — INFLUENZA A VIRUS A/MICHIGAN/45/2015 X-275 (H1N1) ANTIGEN (FORMALDEHYDE INACTIVATED), INFLUENZA A VIRUS A/SINGAPORE/INFIMH-16-0019/2016 IVR-186 (H3N2) ANTIGEN (FORMALDEHYDE INACTIVATED), INFLUENZA B VIRUS B/PHUKET/3073/2013 ANTIGEN (FORMALDEHYDE INACTIVATED), AND INFLUENZA B VIRUS B/MARYLAND/15/2016 BX-69A ANTIGEN (FORMALDEHYDE INACTIVATED) 60; 60; 60; 60 UG/.7ML; UG/.7ML; UG/.7ML; UG/.7ML
INJECTION, SUSPENSION INTRAMUSCULAR
COMMUNITY
Start: 2021-10-13 | End: 2022-01-01

## 2021-11-03 NOTE — PROGRESS NOTES
Subjective   Rut Wright is a 81 y.o. female with   Chief Complaint   Patient presents with   • Fall     has stitches onside of left eye   .    History of Present Illness   81-year-old white female with recent fall while walking in the rain outside of a [employer] store.  She apparently tripped and landed on her left side resulting in a laceration to her left temporal region.  This occurred 4 to 5 days ago when she is here for wound evaluation and possible suture removal.  There was no loss of consciousness/concussion.  She does admit to an increase amount of fatigue.  Last labs were performed in April of this year with borderline thyroid levels.  The following portions of the patient's history were reviewed and updated as appropriate: allergies, current medications, past family history, past medical history, past social history, past surgical history and problem list.    Review of Systems   Constitutional: Positive for fatigue.   Skin: Positive for wound.       Objective     Vitals:    11/03/21 1328   BP: 142/78   Pulse: 105   Temp: 97.1 °F (36.2 °C)   SpO2: 92%       No results found for this or any previous visit (from the past 672 hour(s)).    Physical Exam  Vitals and nursing note reviewed.   Constitutional:       Appearance: Normal appearance. She is well-developed and well-groomed.   HENT:      Head: Normocephalic and atraumatic.   Neck:      Thyroid: No thyroid mass or thyromegaly.      Vascular: Normal carotid pulses. No carotid bruit.      Trachea: Trachea and phonation normal.   Cardiovascular:      Rate and Rhythm: Normal rate and regular rhythm.      Heart sounds: Normal heart sounds. No murmur heard.  No friction rub. No gallop.    Pulmonary:      Effort: Pulmonary effort is normal. No respiratory distress.      Breath sounds: Normal breath sounds. No decreased breath sounds, wheezing, rhonchi or rales.   Musculoskeletal:      Cervical back: Neck supple.   Lymphadenopathy:      Cervical: No cervical  adenopathy.   Skin:     General: Skin is warm and dry.      Findings: Wound present. No rash.      Comments: Left temporal laceration well-healed with 2 sutures removed without difficulty.  Surrounding ecchymosis seems to be resolving without evidence of hematoma.   Neurological:      Mental Status: She is alert and oriented to person, place, and time.   Psychiatric:         Attention and Perception: Attention and perception normal.         Mood and Affect: Mood and affect normal.         Speech: Speech normal.         Behavior: Behavior normal. Behavior is cooperative.         Thought Content: Thought content normal.         Cognition and Memory: Cognition and memory normal.         Judgment: Judgment normal.         Assessment/Plan   Diagnoses and all orders for this visit:    1. Facial laceration, subsequent encounter (Primary)  -     Lipid panel; Future  -     Comprehensive metabolic panel; Future  -     Vitamin D 25 hydroxy; Future  -     TSH; Future  -     CBC w AUTO Differential; Future    2. Fatigue, unspecified type  -     Lipid panel; Future  -     Comprehensive metabolic panel; Future  -     Vitamin D 25 hydroxy; Future  -     TSH; Future  -     CBC w AUTO Differential; Future    3. Mixed hyperlipidemia  -     Lipid panel; Future  -     Comprehensive metabolic panel; Future  -     Vitamin D 25 hydroxy; Future  -     TSH; Future  -     CBC w AUTO Differential; Future    4. Vitamin D deficiency  -     Lipid panel; Future  -     Comprehensive metabolic panel; Future  -     Vitamin D 25 hydroxy; Future  -     TSH; Future  -     CBC w AUTO Differential; Future    5. Mixed anxiety depressive disorder  -     Lipid panel; Future  -     Comprehensive metabolic panel; Future  -     Vitamin D 25 hydroxy; Future  -     TSH; Future  -     CBC w AUTO Differential; Future        Return in about 4 weeks (around 12/1/2021) for Recheck.

## 2021-11-23 DIAGNOSIS — R27.0 ATAXIA: ICD-10-CM

## 2021-11-23 DIAGNOSIS — R42 VERTIGO: ICD-10-CM

## 2021-11-23 RX ORDER — MECLIZINE HYDROCHLORIDE 25 MG/1
TABLET ORAL
Qty: 30 TABLET | Refills: 0 | Status: SHIPPED | OUTPATIENT
Start: 2021-11-23 | End: 2022-01-01

## 2021-12-09 DIAGNOSIS — E55.9 VITAMIN D DEFICIENCY: ICD-10-CM

## 2021-12-09 DIAGNOSIS — R53.83 FATIGUE, UNSPECIFIED TYPE: ICD-10-CM

## 2021-12-09 DIAGNOSIS — F41.8 MIXED ANXIETY DEPRESSIVE DISORDER: ICD-10-CM

## 2021-12-09 DIAGNOSIS — S01.81XD FACIAL LACERATION, SUBSEQUENT ENCOUNTER: ICD-10-CM

## 2021-12-09 DIAGNOSIS — E78.2 MIXED HYPERLIPIDEMIA: ICD-10-CM

## 2022-01-01 ENCOUNTER — APPOINTMENT (OUTPATIENT)
Dept: GENERAL RADIOLOGY | Facility: HOSPITAL | Age: 82
End: 2022-01-01

## 2022-01-01 ENCOUNTER — HOSPITAL ENCOUNTER (EMERGENCY)
Facility: HOSPITAL | Age: 82
Discharge: HOME | End: 2022-07-30
Attending: EMERGENCY MEDICINE | Admitting: EMERGENCY MEDICINE

## 2022-01-01 ENCOUNTER — TELEPHONE (OUTPATIENT)
Dept: FAMILY MEDICINE CLINIC | Facility: CLINIC | Age: 82
End: 2022-01-01

## 2022-01-01 ENCOUNTER — HOSPITAL ENCOUNTER (OUTPATIENT)
Facility: HOSPITAL | Age: 82
Setting detail: OBSERVATION
Discharge: NURSING, REHAB OR LONG-TERM CARE | End: 2022-09-03
Attending: EMERGENCY MEDICINE | Admitting: HOSPITALIST

## 2022-01-01 ENCOUNTER — APPOINTMENT (OUTPATIENT)
Dept: CT IMAGING | Facility: HOSPITAL | Age: 82
End: 2022-01-01

## 2022-01-01 ENCOUNTER — OFFICE VISIT (OUTPATIENT)
Dept: FAMILY MEDICINE CLINIC | Facility: CLINIC | Age: 82
End: 2022-01-01

## 2022-01-01 ENCOUNTER — TELEPHONE (OUTPATIENT)
Dept: ORTHOPEDIC SURGERY | Facility: CLINIC | Age: 82
End: 2022-01-01

## 2022-01-01 ENCOUNTER — APPOINTMENT (OUTPATIENT)
Dept: MRI IMAGING | Facility: HOSPITAL | Age: 82
End: 2022-01-01

## 2022-01-01 VITALS
DIASTOLIC BLOOD PRESSURE: 74 MMHG | WEIGHT: 160 LBS | TEMPERATURE: 97.7 F | BODY MASS INDEX: 29.44 KG/M2 | HEART RATE: 86 BPM | SYSTOLIC BLOOD PRESSURE: 158 MMHG | HEIGHT: 62 IN | OXYGEN SATURATION: 96 % | RESPIRATION RATE: 18 BRPM

## 2022-01-01 VITALS
OXYGEN SATURATION: 100 % | DIASTOLIC BLOOD PRESSURE: 82 MMHG | HEIGHT: 62 IN | HEART RATE: 85 BPM | TEMPERATURE: 98 F | WEIGHT: 160 LBS | RESPIRATION RATE: 14 BRPM | BODY MASS INDEX: 29.44 KG/M2 | SYSTOLIC BLOOD PRESSURE: 181 MMHG

## 2022-01-01 VITALS
TEMPERATURE: 98 F | WEIGHT: 157 LBS | SYSTOLIC BLOOD PRESSURE: 192 MMHG | HEART RATE: 82 BPM | DIASTOLIC BLOOD PRESSURE: 88 MMHG | BODY MASS INDEX: 28.89 KG/M2 | OXYGEN SATURATION: 93 % | HEIGHT: 62 IN

## 2022-01-01 VITALS
HEIGHT: 62 IN | TEMPERATURE: 97.3 F | SYSTOLIC BLOOD PRESSURE: 158 MMHG | OXYGEN SATURATION: 96 % | WEIGHT: 146 LBS | DIASTOLIC BLOOD PRESSURE: 82 MMHG | BODY MASS INDEX: 26.87 KG/M2 | HEART RATE: 91 BPM

## 2022-01-01 DIAGNOSIS — S09.90XA CLOSED HEAD INJURY, INITIAL ENCOUNTER: ICD-10-CM

## 2022-01-01 DIAGNOSIS — E03.9 ACQUIRED HYPOTHYROIDISM: ICD-10-CM

## 2022-01-01 DIAGNOSIS — M15.9 GENERALIZED OSTEOARTHRITIS: ICD-10-CM

## 2022-01-01 DIAGNOSIS — F41.8 MIXED ANXIETY DEPRESSIVE DISORDER: ICD-10-CM

## 2022-01-01 DIAGNOSIS — E78.2 MIXED HYPERLIPIDEMIA: ICD-10-CM
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
## 2022-01-01 DIAGNOSIS — J45.40 MODERATE PERSISTENT ASTHMA WITHOUT COMPLICATION: ICD-10-CM

## 2022-01-01 DIAGNOSIS — R73.02 GLUCOSE INTOLERANCE (IMPAIRED GLUCOSE TOLERANCE): ICD-10-CM

## 2022-01-01 DIAGNOSIS — S80.12XA CONTUSION OF LEFT LOWER EXTREMITY, INITIAL ENCOUNTER: ICD-10-CM

## 2022-01-01 DIAGNOSIS — E55.9 VITAMIN D DEFICIENCY: ICD-10-CM

## 2022-01-01 DIAGNOSIS — R42 VERTIGO: ICD-10-CM

## 2022-01-01 DIAGNOSIS — I10 ESSENTIAL HYPERTENSION: ICD-10-CM

## 2022-01-01 DIAGNOSIS — Z00.00 MEDICARE ANNUAL WELLNESS VISIT, SUBSEQUENT: Primary | ICD-10-CM
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
## 2022-01-01 DIAGNOSIS — R29.6 FALLING EPISODES: Primary | ICD-10-CM

## 2022-01-01 DIAGNOSIS — Z74.09 IMMOBILITY: ICD-10-CM

## 2022-01-01 DIAGNOSIS — R27.0 ATAXIA: ICD-10-CM

## 2022-01-01 DIAGNOSIS — N39.45 CONTINUOUS LEAKAGE OF URINE: ICD-10-CM

## 2022-01-01 DIAGNOSIS — R51.9 NONINTRACTABLE HEADACHE, UNSPECIFIED CHRONICITY PATTERN, UNSPECIFIED HEADACHE TYPE: ICD-10-CM

## 2022-01-01 DIAGNOSIS — R41.3 MEMORY CHANGES: ICD-10-CM

## 2022-01-01 DIAGNOSIS — S51.812A SKIN TEAR OF LEFT FOREARM WITHOUT COMPLICATION, INITIAL ENCOUNTER: Primary | ICD-10-CM

## 2022-01-01 DIAGNOSIS — R41.82 ALTERED MENTAL STATUS, UNSPECIFIED ALTERED MENTAL STATUS TYPE: Primary | ICD-10-CM

## 2022-01-01 LAB
25(OH)D3 SERPL-MCNC: 47 NG/ML (ref 30–100)
25(OH)D3+25(OH)D2 SERPL-MCNC: 38.9 NG/ML (ref 30–100)
ALBUMIN SERPL-MCNC: 4.3 G/DL (ref 3.5–5.2)
ALBUMIN SERPL-MCNC: 4.4 G/DL (ref 3.6–4.6)
ALBUMIN/GLOB SERPL: 1.7 {RATIO} (ref 1.2–2.2)
ALBUMIN/GLOB SERPL: 2.2 G/DL
ALP SERPL-CCNC: 113 IU/L (ref 44–121)
ALP SERPL-CCNC: 96 U/L (ref 39–117)
ALT SERPL W P-5'-P-CCNC: 11 U/L (ref 1–33)
ALT SERPL-CCNC: 14 IU/L (ref 0–32)
ANION GAP SERPL CALCULATED.3IONS-SCNC: 12.1 MMOL/L (ref 5–15)
ANION GAP SERPL CALCULATED.3IONS-SCNC: 7.5 MMOL/L (ref 5–15)
APTT PPP: 23.1 SECONDS (ref 22.7–35.4)
AST SERPL-CCNC: 14 U/L (ref 1–32)
AST SERPL-CCNC: 18 IU/L (ref 0–40)
BASOPHILS # BLD AUTO: 0.04 10*3/MM3 (ref 0–0.2)
BASOPHILS # BLD AUTO: 0.1 X10E3/UL (ref 0–0.2)
BASOPHILS NFR BLD AUTO: 0.6 % (ref 0–1.5)
BASOPHILS NFR BLD AUTO: 1 %
BILIRUB SERPL-MCNC: 0.6 MG/DL (ref 0–1.2)
BILIRUB SERPL-MCNC: 0.6 MG/DL (ref 0–1.2)
BILIRUB UR QL STRIP: NEGATIVE
BUN SERPL-MCNC: 10 MG/DL (ref 8–27)
BUN SERPL-MCNC: 13 MG/DL (ref 8–23)
BUN SERPL-MCNC: 7 MG/DL (ref 8–23)
BUN/CREAT SERPL: 12 (ref 12–28)
BUN/CREAT SERPL: 12.6 (ref 7–25)
BUN/CREAT SERPL: 9.3 (ref 7–25)
CALCIUM SERPL-MCNC: 9.8 MG/DL (ref 8.7–10.3)
CALCIUM SPEC-SCNC: 10 MG/DL (ref 8.6–10.5)
CALCIUM SPEC-SCNC: 9 MG/DL (ref 8.6–10.5)
CHLORIDE SERPL-SCNC: 102 MMOL/L (ref 96–106)
CHLORIDE SERPL-SCNC: 102 MMOL/L (ref 98–107)
CHLORIDE SERPL-SCNC: 106 MMOL/L (ref 98–107)
CHOLEST SERPL-MCNC: 160 MG/DL (ref 100–199)
CK SERPL-CCNC: 129 U/L (ref 20–180)
CLARITY UR: CLEAR
CO2 SERPL-SCNC: 22 MMOL/L (ref 20–29)
CO2 SERPL-SCNC: 22.9 MMOL/L (ref 22–29)
CO2 SERPL-SCNC: 23.5 MMOL/L (ref 22–29)
COLOR UR: YELLOW
CREAT SERPL-MCNC: 0.75 MG/DL (ref 0.57–1)
CREAT SERPL-MCNC: 0.84 MG/DL (ref 0.57–1)
CREAT SERPL-MCNC: 1.03 MG/DL (ref 0.57–1)
DEPRECATED RDW RBC AUTO: 40.9 FL (ref 37–54)
DEPRECATED RDW RBC AUTO: 42.2 FL (ref 37–54)
EGFRCR SERPLBLD CKD-EPI 2021: 54.7 ML/MIN/1.73
EGFRCR SERPLBLD CKD-EPI 2021: 70 ML/MIN/1.73
EGFRCR SERPLBLD CKD-EPI 2021: 80.1 ML/MIN/1.73
EOSINOPHIL # BLD AUTO: 0.28 10*3/MM3 (ref 0–0.4)
EOSINOPHIL # BLD AUTO: 0.4 X10E3/UL (ref 0–0.4)
EOSINOPHIL NFR BLD AUTO: 3.9 % (ref 0.3–6.2)
EOSINOPHIL NFR BLD AUTO: 5 %
ERYTHROCYTE [DISTWIDTH] IN BLOOD BY AUTOMATED COUNT: 13.1 % (ref 12.3–15.4)
ERYTHROCYTE [DISTWIDTH] IN BLOOD BY AUTOMATED COUNT: 13.2 % (ref 12.3–15.4)
ERYTHROCYTE [DISTWIDTH] IN BLOOD BY AUTOMATED COUNT: 13.3 % (ref 11.7–15.4)
FOLATE SERPL-MCNC: 12.1 NG/ML (ref 4.78–24.2)
GLOBULIN SER CALC-MCNC: 2.6 G/DL (ref 1.5–4.5)
GLOBULIN UR ELPH-MCNC: 2 GM/DL
GLUCOSE SERPL-MCNC: 126 MG/DL (ref 65–99)
GLUCOSE SERPL-MCNC: 90 MG/DL (ref 65–99)
GLUCOSE SERPL-MCNC: 91 MG/DL (ref 65–99)
GLUCOSE UR STRIP-MCNC: NEGATIVE MG/DL
HBA1C MFR BLD: 5.7 % (ref 4.8–5.6)
HCT VFR BLD AUTO: 37.4 % (ref 34–46.6)
HCT VFR BLD AUTO: 38.4 % (ref 34–46.6)
HCT VFR BLD AUTO: 44.1 % (ref 34–46.6)
HDLC SERPL-MCNC: 72 MG/DL
HGB BLD-MCNC: 12.4 G/DL (ref 12–15.9)
HGB BLD-MCNC: 12.5 G/DL (ref 12–15.9)
HGB BLD-MCNC: 14.2 G/DL (ref 11.1–15.9)
HGB UR QL STRIP.AUTO: NEGATIVE
IMM GRANULOCYTES # BLD AUTO: 0 X10E3/UL (ref 0–0.1)
IMM GRANULOCYTES # BLD AUTO: 0.02 10*3/MM3 (ref 0–0.05)
IMM GRANULOCYTES NFR BLD AUTO: 0 %
IMM GRANULOCYTES NFR BLD AUTO: 0.3 % (ref 0–0.5)
INR PPP: 1.25 (ref 0.9–1.1)
KETONES UR QL STRIP: ABNORMAL
LDLC SERPL CALC-MCNC: 63 MG/DL (ref 0–99)
LEUKOCYTE ESTERASE UR QL STRIP.AUTO: NEGATIVE
LYMPHOCYTES # BLD AUTO: 1.38 10*3/MM3 (ref 0.7–3.1)
LYMPHOCYTES # BLD AUTO: 2.4 X10E3/UL (ref 0.7–3.1)
LYMPHOCYTES NFR BLD AUTO: 19.1 % (ref 19.6–45.3)
LYMPHOCYTES NFR BLD AUTO: 34 %
MCH RBC QN AUTO: 28.6 PG (ref 26.6–33)
MCH RBC QN AUTO: 28.9 PG (ref 26.6–33)
MCH RBC QN AUTO: 28.9 PG (ref 26.6–33)
MCHC RBC AUTO-ENTMCNC: 32.2 G/DL (ref 31.5–35.7)
MCHC RBC AUTO-ENTMCNC: 32.6 G/DL (ref 31.5–35.7)
MCHC RBC AUTO-ENTMCNC: 33.2 G/DL (ref 31.5–35.7)
MCV RBC AUTO: 87.2 FL (ref 79–97)
MCV RBC AUTO: 88.9 FL (ref 79–97)
MCV RBC AUTO: 89 FL (ref 79–97)
MONOCYTES # BLD AUTO: 0.6 X10E3/UL (ref 0.1–0.9)
MONOCYTES # BLD AUTO: 0.7 10*3/MM3 (ref 0.1–0.9)
MONOCYTES NFR BLD AUTO: 9 %
MONOCYTES NFR BLD AUTO: 9.7 % (ref 5–12)
NEUTROPHILS # BLD AUTO: 3.5 X10E3/UL (ref 1.4–7)
NEUTROPHILS NFR BLD AUTO: 4.79 10*3/MM3 (ref 1.7–7)
NEUTROPHILS NFR BLD AUTO: 51 %
NEUTROPHILS NFR BLD AUTO: 66.4 % (ref 42.7–76)
NITRITE UR QL STRIP: NEGATIVE
NRBC BLD AUTO-RTO: 0 /100 WBC (ref 0–0.2)
PH UR STRIP.AUTO: 6 [PH] (ref 5–8)
PLATELET # BLD AUTO: 229 10*3/MM3 (ref 140–450)
PLATELET # BLD AUTO: 247 10*3/MM3 (ref 140–450)
PLATELET # BLD AUTO: 257 X10E3/UL (ref 150–450)
PMV BLD AUTO: 9.3 FL (ref 6–12)
PMV BLD AUTO: 9.3 FL (ref 6–12)
POTASSIUM SERPL-SCNC: 4 MMOL/L (ref 3.5–5.2)
POTASSIUM SERPL-SCNC: 4.1 MMOL/L (ref 3.5–5.2)
POTASSIUM SERPL-SCNC: 4.5 MMOL/L (ref 3.5–5.2)
PROT SERPL-MCNC: 6.3 G/DL (ref 6–8.5)
PROT SERPL-MCNC: 7 G/DL (ref 6–8.5)
PROT UR QL STRIP: NEGATIVE
PROTHROMBIN TIME: 15.5 SECONDS (ref 11.7–14.2)
RBC # BLD AUTO: 4.29 10*6/MM3 (ref 3.77–5.28)
RBC # BLD AUTO: 4.32 10*6/MM3 (ref 3.77–5.28)
RBC # BLD AUTO: 4.96 X10E6/UL (ref 3.77–5.28)
SODIUM SERPL-SCNC: 137 MMOL/L (ref 136–145)
SODIUM SERPL-SCNC: 137 MMOL/L (ref 136–145)
SODIUM SERPL-SCNC: 140 MMOL/L (ref 134–144)
SP GR UR STRIP: 1.01 (ref 1–1.03)
TRIGL SERPL-MCNC: 149 MG/DL (ref 0–149)
TROPONIN T SERPL-MCNC: <0.01 NG/ML (ref 0–0.03)
TSH SERPL DL<=0.005 MIU/L-ACNC: 3.03 UIU/ML (ref 0.45–4.5)
TSH SERPL DL<=0.05 MIU/L-ACNC: 3.52 UIU/ML (ref 0.27–4.2)
UROBILINOGEN UR QL STRIP: ABNORMAL
VIT B12 BLD-MCNC: 448 PG/ML (ref 211–946)
VLDLC SERPL CALC-MCNC: 25 MG/DL (ref 5–40)
WBC # BLD AUTO: 6.9 X10E3/UL (ref 3.4–10.8)
WBC NRBC COR # BLD: 6.59 10*3/MM3 (ref 3.4–10.8)
WBC NRBC COR # BLD: 7.21 10*3/MM3 (ref 3.4–10.8)

## 2022-01-01 PROCEDURE — G0378 HOSPITAL OBSERVATION PER HR: HCPCS

## 2022-01-01 PROCEDURE — 99203 OFFICE O/P NEW LOW 30 MIN: CPT | Performed by: ORTHOPAEDIC SURGERY

## 2022-01-01 PROCEDURE — 85025 COMPLETE CBC W/AUTO DIFF WBC: CPT | Performed by: EMERGENCY MEDICINE

## 2022-01-01 PROCEDURE — 97166 OT EVAL MOD COMPLEX 45 MIN: CPT

## 2022-01-01 PROCEDURE — 63710000001 ONDANSETRON PER 8 MG: Performed by: NURSE PRACTITIONER

## 2022-01-01 PROCEDURE — 84443 ASSAY THYROID STIM HORMONE: CPT | Performed by: NURSE PRACTITIONER

## 2022-01-01 PROCEDURE — 97110 THERAPEUTIC EXERCISES: CPT

## 2022-01-01 PROCEDURE — 94799 UNLISTED PULMONARY SVC/PX: CPT

## 2022-01-01 PROCEDURE — 96376 TX/PRO/DX INJ SAME DRUG ADON: CPT

## 2022-01-01 PROCEDURE — 73502 X-RAY EXAM HIP UNI 2-3 VIEWS: CPT

## 2022-01-01 PROCEDURE — 85730 THROMBOPLASTIN TIME PARTIAL: CPT | Performed by: EMERGENCY MEDICINE

## 2022-01-01 PROCEDURE — 94664 DEMO&/EVAL PT USE INHALER: CPT

## 2022-01-01 PROCEDURE — 36415 COLL VENOUS BLD VENIPUNCTURE: CPT | Performed by: NURSE PRACTITIONER

## 2022-01-01 PROCEDURE — 80048 BASIC METABOLIC PNL TOTAL CA: CPT | Performed by: NURSE PRACTITIONER

## 2022-01-01 PROCEDURE — 99213 OFFICE O/P EST LOW 20 MIN: CPT | Performed by: FAMILY MEDICINE

## 2022-01-01 PROCEDURE — 25010000002 ONDANSETRON PER 1 MG: Performed by: NURSE PRACTITIONER

## 2022-01-01 PROCEDURE — 85610 PROTHROMBIN TIME: CPT | Performed by: EMERGENCY MEDICINE

## 2022-01-01 PROCEDURE — 73110 X-RAY EXAM OF WRIST: CPT

## 2022-01-01 PROCEDURE — 36415 COLL VENOUS BLD VENIPUNCTURE: CPT

## 2022-01-01 PROCEDURE — 96374 THER/PROPH/DIAG INJ IV PUSH: CPT

## 2022-01-01 PROCEDURE — 82550 ASSAY OF CK (CPK): CPT | Performed by: NURSE PRACTITIONER

## 2022-01-01 PROCEDURE — 97162 PT EVAL MOD COMPLEX 30 MIN: CPT

## 2022-01-01 PROCEDURE — 85027 COMPLETE CBC AUTOMATED: CPT | Performed by: NURSE PRACTITIONER

## 2022-01-01 PROCEDURE — 81003 URINALYSIS AUTO W/O SCOPE: CPT | Performed by: EMERGENCY MEDICINE

## 2022-01-01 PROCEDURE — 99285 EMERGENCY DEPT VISIT HI MDM: CPT

## 2022-01-01 PROCEDURE — G0439 PPPS, SUBSEQ VISIT: HCPCS | Performed by: FAMILY MEDICINE

## 2022-01-01 PROCEDURE — 82607 VITAMIN B-12: CPT | Performed by: NURSE PRACTITIONER

## 2022-01-01 PROCEDURE — 1160F RVW MEDS BY RX/DR IN RCRD: CPT | Performed by: FAMILY MEDICINE

## 2022-01-01 PROCEDURE — 97110 THERAPEUTIC EXERCISES: CPT | Performed by: OCCUPATIONAL THERAPIST

## 2022-01-01 PROCEDURE — 97530 THERAPEUTIC ACTIVITIES: CPT

## 2022-01-01 PROCEDURE — 99283 EMERGENCY DEPT VISIT LOW MDM: CPT

## 2022-01-01 PROCEDURE — 70450 CT HEAD/BRAIN W/O DYE: CPT

## 2022-01-01 PROCEDURE — 84484 ASSAY OF TROPONIN QUANT: CPT | Performed by: EMERGENCY MEDICINE

## 2022-01-01 PROCEDURE — 94640 AIRWAY INHALATION TREATMENT: CPT

## 2022-01-01 PROCEDURE — 70551 MRI BRAIN STEM W/O DYE: CPT

## 2022-01-01 PROCEDURE — P9612 CATHETERIZE FOR URINE SPEC: HCPCS

## 2022-01-01 PROCEDURE — 82306 VITAMIN D 25 HYDROXY: CPT | Performed by: HOSPITALIST

## 2022-01-01 PROCEDURE — 82746 ASSAY OF FOLIC ACID SERUM: CPT | Performed by: NURSE PRACTITIONER

## 2022-01-01 PROCEDURE — 97535 SELF CARE MNGMENT TRAINING: CPT

## 2022-01-01 PROCEDURE — 1170F FXNL STATUS ASSESSED: CPT | Performed by: FAMILY MEDICINE

## 2022-01-01 PROCEDURE — 73560 X-RAY EXAM OF KNEE 1 OR 2: CPT

## 2022-01-01 PROCEDURE — 80053 COMPREHEN METABOLIC PANEL: CPT | Performed by: EMERGENCY MEDICINE

## 2022-01-01 PROCEDURE — 96361 HYDRATE IV INFUSION ADD-ON: CPT

## 2022-01-01 PROCEDURE — 99214 OFFICE O/P EST MOD 30 MIN: CPT | Performed by: FAMILY MEDICINE

## 2022-01-01 RX ORDER — VENLAFAXINE HYDROCHLORIDE 150 MG/1
150 CAPSULE, EXTENDED RELEASE ORAL DAILY
Qty: 30 CAPSULE | Refills: 0 | Status: SHIPPED | OUTPATIENT
Start: 2022-01-01 | End: 2022-01-01

## 2022-01-01 RX ORDER — ETODOLAC 400 MG/1
TABLET, FILM COATED ORAL
Qty: 30 TABLET | Refills: 0 | Status: SHIPPED | OUTPATIENT
Start: 2022-01-01 | End: 2022-01-01

## 2022-01-01 RX ORDER — ACETAMINOPHEN 160 MG/5ML
650 SOLUTION ORAL EVERY 4 HOURS PRN
Status: DISCONTINUED | OUTPATIENT
Start: 2022-01-01 | End: 2022-01-01 | Stop reason: HOSPADM

## 2022-01-01 RX ORDER — MONTELUKAST SODIUM 10 MG/1
10 TABLET ORAL NIGHTLY
Qty: 90 TABLET | Refills: 1 | Status: SHIPPED | OUTPATIENT
Start: 2022-01-01

## 2022-01-01 RX ORDER — ACETAMINOPHEN 325 MG/1
650 TABLET ORAL EVERY 4 HOURS PRN
Start: 2022-01-01

## 2022-01-01 RX ORDER — SOLIFENACIN SUCCINATE 10 MG/1
10 TABLET, FILM COATED ORAL DAILY
Qty: 30 TABLET | Refills: 5 | Status: SHIPPED | OUTPATIENT
Start: 2022-01-01 | End: 2022-01-01 | Stop reason: ALTCHOICE

## 2022-01-01 RX ORDER — SODIUM CHLORIDE 0.9 % (FLUSH) 0.9 %
10 SYRINGE (ML) INJECTION EVERY 12 HOURS SCHEDULED
Status: DISCONTINUED | OUTPATIENT
Start: 2022-01-01 | End: 2022-01-01 | Stop reason: HOSPADM

## 2022-01-01 RX ORDER — LISINOPRIL 5 MG/1
5 TABLET ORAL EVERY 12 HOURS SCHEDULED
Start: 2022-01-01 | End: 2022-01-01 | Stop reason: DRUGHIGH

## 2022-01-01 RX ORDER — ETODOLAC 400 MG/1
TABLET, FILM COATED ORAL
Qty: 90 TABLET | Refills: 1 | Status: SHIPPED | OUTPATIENT
Start: 2022-01-01 | End: 2022-01-01 | Stop reason: HOSPADM

## 2022-01-01 RX ORDER — MONTELUKAST SODIUM 10 MG/1
10 TABLET ORAL NIGHTLY
Status: DISCONTINUED | OUTPATIENT
Start: 2022-01-01 | End: 2022-01-01 | Stop reason: HOSPADM

## 2022-01-01 RX ORDER — LIDOCAINE 40 MG/G
1 CREAM TOPICAL ONCE
Status: COMPLETED | OUTPATIENT
Start: 2022-01-01 | End: 2022-01-01

## 2022-01-01 RX ORDER — MELATONIN
2000 DAILY
Status: DISCONTINUED | OUTPATIENT
Start: 2022-01-01 | End: 2022-01-01 | Stop reason: HOSPADM

## 2022-01-01 RX ORDER — VENLAFAXINE HYDROCHLORIDE 150 MG/1
150 CAPSULE, EXTENDED RELEASE ORAL DAILY
Status: DISCONTINUED | OUTPATIENT
Start: 2022-01-01 | End: 2022-01-01 | Stop reason: HOSPADM

## 2022-01-01 RX ORDER — LEVOTHYROXINE SODIUM 0.05 MG/1
50 TABLET ORAL DAILY
Status: DISCONTINUED | OUTPATIENT
Start: 2022-01-01 | End: 2022-01-01 | Stop reason: HOSPADM

## 2022-01-01 RX ORDER — BUDESONIDE AND FORMOTEROL FUMARATE DIHYDRATE 80; 4.5 UG/1; UG/1
2 AEROSOL RESPIRATORY (INHALATION)
Refills: 5 | Status: DISCONTINUED | OUTPATIENT
Start: 2022-01-01 | End: 2022-01-01 | Stop reason: HOSPADM

## 2022-01-01 RX ORDER — EZETIMIBE AND SIMVASTATIN 10; 20 MG/1; MG/1
TABLET ORAL
Qty: 90 TABLET | Refills: 0 | Status: SHIPPED | OUTPATIENT
Start: 2022-01-01 | End: 2022-01-01

## 2022-01-01 RX ORDER — DONEPEZIL HYDROCHLORIDE 5 MG/1
5 TABLET, FILM COATED ORAL NIGHTLY
Qty: 30 TABLET | Refills: 0 | Status: SHIPPED | OUTPATIENT
Start: 2022-01-01

## 2022-01-01 RX ORDER — ETODOLAC 400 MG/1
TABLET, FILM COATED ORAL
Qty: 15 TABLET | Refills: 0 | Status: SHIPPED | OUTPATIENT
Start: 2022-01-01 | End: 2022-01-01 | Stop reason: SDUPTHER

## 2022-01-01 RX ORDER — MECLIZINE HYDROCHLORIDE 25 MG/1
25 TABLET ORAL 3 TIMES DAILY PRN
Status: DISCONTINUED | OUTPATIENT
Start: 2022-01-01 | End: 2022-01-01 | Stop reason: HOSPADM

## 2022-01-01 RX ORDER — LEVOTHYROXINE SODIUM 0.05 MG/1
50 TABLET ORAL DAILY
Qty: 90 TABLET | Refills: 1 | Status: SHIPPED | OUTPATIENT
Start: 2022-01-01

## 2022-01-01 RX ORDER — EZETIMIBE AND SIMVASTATIN 10; 20 MG/1; MG/1
1 TABLET ORAL NIGHTLY
Qty: 15 TABLET | Refills: 0 | Status: SHIPPED | OUTPATIENT
Start: 2022-01-01 | End: 2022-01-01 | Stop reason: SDUPTHER

## 2022-01-01 RX ORDER — LISINOPRIL 5 MG/1
5 TABLET ORAL
Status: DISCONTINUED | OUTPATIENT
Start: 2022-01-01 | End: 2022-01-01

## 2022-01-01 RX ORDER — BUPROPION HYDROCHLORIDE 300 MG/1
300 TABLET ORAL DAILY
Qty: 90 TABLET | Refills: 1 | Status: SHIPPED | OUTPATIENT
Start: 2022-01-01

## 2022-01-01 RX ORDER — VENLAFAXINE HYDROCHLORIDE 150 MG/1
150 CAPSULE, EXTENDED RELEASE ORAL DAILY
Qty: 30 CAPSULE | Refills: 1 | Status: SHIPPED | OUTPATIENT
Start: 2022-01-01 | End: 2022-01-01

## 2022-01-01 RX ORDER — SODIUM CHLORIDE 0.9 % (FLUSH) 0.9 %
10 SYRINGE (ML) INJECTION AS NEEDED
Status: DISCONTINUED | OUTPATIENT
Start: 2022-01-01 | End: 2022-01-01 | Stop reason: HOSPADM

## 2022-01-01 RX ORDER — LIDOCAINE 40 MG/G
CREAM TOPICAL
Status: DISCONTINUED
Start: 2022-01-01 | End: 2022-01-01 | Stop reason: HOSPADM

## 2022-01-01 RX ORDER — ANASTROZOLE 1 MG/1
1 TABLET ORAL DAILY
Status: DISCONTINUED | OUTPATIENT
Start: 2022-01-01 | End: 2022-01-01 | Stop reason: HOSPADM

## 2022-01-01 RX ORDER — VENLAFAXINE HYDROCHLORIDE 150 MG/1
CAPSULE, EXTENDED RELEASE ORAL
Qty: 15 CAPSULE | Refills: 0 | Status: SHIPPED | OUTPATIENT
Start: 2022-01-01 | End: 2022-01-01

## 2022-01-01 RX ORDER — ACETAMINOPHEN 325 MG/1
650 TABLET ORAL EVERY 4 HOURS PRN
Status: DISCONTINUED | OUTPATIENT
Start: 2022-01-01 | End: 2022-01-01 | Stop reason: HOSPADM

## 2022-01-01 RX ORDER — BUPROPION HYDROCHLORIDE 300 MG/1
300 TABLET ORAL DAILY
Status: DISCONTINUED | OUTPATIENT
Start: 2022-01-01 | End: 2022-01-01 | Stop reason: HOSPADM

## 2022-01-01 RX ORDER — VENLAFAXINE HYDROCHLORIDE 150 MG/1
CAPSULE, EXTENDED RELEASE ORAL
Qty: 30 CAPSULE | Refills: 0 | Status: SHIPPED | OUTPATIENT
Start: 2022-01-01 | End: 2022-01-01

## 2022-01-01 RX ORDER — EZETIMIBE AND SIMVASTATIN 10; 20 MG/1; MG/1
1 TABLET ORAL NIGHTLY
Qty: 90 TABLET | Refills: 1 | OUTPATIENT
Start: 2022-01-01

## 2022-01-01 RX ORDER — CHOLECALCIFEROL (VITAMIN D3) 125 MCG
5 CAPSULE ORAL NIGHTLY PRN
Status: DISCONTINUED | OUTPATIENT
Start: 2022-01-01 | End: 2022-01-01 | Stop reason: HOSPADM

## 2022-01-01 RX ORDER — VENLAFAXINE HYDROCHLORIDE 150 MG/1
150 CAPSULE, EXTENDED RELEASE ORAL DAILY
Qty: 90 CAPSULE | Refills: 1 | Status: SHIPPED | OUTPATIENT
Start: 2022-01-01

## 2022-01-01 RX ORDER — SOLIFENACIN SUCCINATE 10 MG/1
TABLET, FILM COATED ORAL
COMMUNITY
Start: 2022-01-01

## 2022-01-01 RX ORDER — VENLAFAXINE HYDROCHLORIDE 150 MG/1
150 CAPSULE, EXTENDED RELEASE ORAL DAILY
Qty: 15 CAPSULE | Refills: 0 | Status: SHIPPED | OUTPATIENT
Start: 2022-01-01 | End: 2022-01-01 | Stop reason: SDUPTHER

## 2022-01-01 RX ORDER — FLUTICASONE FUROATE AND VILANTEROL TRIFENATATE 100; 25 UG/1; UG/1
POWDER RESPIRATORY (INHALATION)
Qty: 60 EACH | Refills: 5 | Status: SHIPPED | OUTPATIENT
Start: 2022-01-01

## 2022-01-01 RX ORDER — EZETIMIBE AND SIMVASTATIN 10; 20 MG/1; MG/1
1 TABLET ORAL NIGHTLY
Qty: 30 TABLET | Refills: 0 | Status: SHIPPED | OUTPATIENT
Start: 2022-01-01 | End: 2022-01-01

## 2022-01-01 RX ORDER — ONDANSETRON 4 MG/1
4 TABLET, FILM COATED ORAL EVERY 6 HOURS PRN
Status: DISCONTINUED | OUTPATIENT
Start: 2022-01-01 | End: 2022-01-01 | Stop reason: HOSPADM

## 2022-01-01 RX ORDER — ONDANSETRON 2 MG/ML
4 INJECTION INTRAMUSCULAR; INTRAVENOUS EVERY 6 HOURS PRN
Status: DISCONTINUED | OUTPATIENT
Start: 2022-01-01 | End: 2022-01-01 | Stop reason: HOSPADM

## 2022-01-01 RX ORDER — NITROGLYCERIN 0.4 MG/1
0.4 TABLET SUBLINGUAL
Status: DISCONTINUED | OUTPATIENT
Start: 2022-01-01 | End: 2022-01-01 | Stop reason: HOSPADM

## 2022-01-01 RX ORDER — EZETIMIBE AND SIMVASTATIN 10; 20 MG/1; MG/1
1 TABLET ORAL NIGHTLY
Qty: 90 TABLET | Refills: 1 | Status: SHIPPED | OUTPATIENT
Start: 2022-01-01

## 2022-01-01 RX ORDER — LISINOPRIL 5 MG/1
5 TABLET ORAL EVERY 12 HOURS SCHEDULED
Status: DISCONTINUED | OUTPATIENT
Start: 2022-01-01 | End: 2022-01-01 | Stop reason: HOSPADM

## 2022-01-01 RX ORDER — SODIUM CHLORIDE 9 MG/ML
100 INJECTION, SOLUTION INTRAVENOUS CONTINUOUS
Status: DISCONTINUED | OUTPATIENT
Start: 2022-01-01 | End: 2022-01-01

## 2022-01-01 RX ORDER — LISINOPRIL 20 MG/1
20 TABLET ORAL DAILY
Qty: 90 TABLET | Refills: 0 | Status: SHIPPED | OUTPATIENT
Start: 2022-01-01

## 2022-01-01 RX ORDER — ERGOCALCIFEROL 1.25 MG/1
50000 CAPSULE ORAL ONCE
Status: COMPLETED | OUTPATIENT
Start: 2022-01-01 | End: 2022-01-01

## 2022-01-01 RX ORDER — ATORVASTATIN CALCIUM 20 MG/1
10 TABLET, FILM COATED ORAL NIGHTLY
Refills: 1 | Status: DISCONTINUED | OUTPATIENT
Start: 2022-01-01 | End: 2022-01-01 | Stop reason: HOSPADM

## 2022-01-01 RX ORDER — EZETIMIBE AND SIMVASTATIN 10; 20 MG/1; MG/1
TABLET ORAL
Qty: 15 TABLET | Refills: 0 | Status: SHIPPED | OUTPATIENT
Start: 2022-01-01 | End: 2022-01-01

## 2022-01-01 RX ORDER — MECLIZINE HYDROCHLORIDE 25 MG/1
TABLET ORAL
Qty: 30 TABLET | Refills: 0 | Status: SHIPPED | OUTPATIENT
Start: 2022-01-01

## 2022-01-01 RX ORDER — MONTELUKAST SODIUM 10 MG/1
10 TABLET ORAL NIGHTLY
Qty: 30 TABLET | Refills: 0 | Status: SHIPPED | OUTPATIENT
Start: 2022-01-01 | End: 2022-01-01 | Stop reason: SDUPTHER

## 2022-01-01 RX ORDER — ACETAMINOPHEN 650 MG/1
650 SUPPOSITORY RECTAL EVERY 4 HOURS PRN
Status: DISCONTINUED | OUTPATIENT
Start: 2022-01-01 | End: 2022-01-01 | Stop reason: HOSPADM

## 2022-01-01 RX ADMIN — ANASTROZOLE 1 MG: 1 TABLET ORAL at 09:55

## 2022-01-01 RX ADMIN — LEVOTHYROXINE SODIUM 50 MCG: 0.05 TABLET ORAL at 10:08

## 2022-01-01 RX ADMIN — MONTELUKAST SODIUM 10 MG: 10 TABLET, FILM COATED ORAL at 20:22

## 2022-01-01 RX ADMIN — Medication 2000 UNITS: at 09:14

## 2022-01-01 RX ADMIN — ONDANSETRON 4 MG: 2 INJECTION INTRAMUSCULAR; INTRAVENOUS at 20:14

## 2022-01-01 RX ADMIN — VENLAFAXINE HYDROCHLORIDE 150 MG: 150 CAPSULE, EXTENDED RELEASE ORAL at 09:54

## 2022-01-01 RX ADMIN — ONDANSETRON HYDROCHLORIDE 4 MG: 4 TABLET, FILM COATED ORAL at 20:57

## 2022-01-01 RX ADMIN — Medication 2000 UNITS: at 08:34

## 2022-01-01 RX ADMIN — BUDESONIDE AND FORMOTEROL FUMARATE DIHYDRATE 2 PUFF: 80; 4.5 AEROSOL RESPIRATORY (INHALATION) at 19:59

## 2022-01-01 RX ADMIN — MONTELUKAST SODIUM 10 MG: 10 TABLET, FILM COATED ORAL at 20:20

## 2022-01-01 RX ADMIN — SODIUM CHLORIDE 100 ML/HR: 9 INJECTION, SOLUTION INTRAVENOUS at 03:43

## 2022-01-01 RX ADMIN — LISINOPRIL 5 MG: 5 TABLET ORAL at 14:34

## 2022-01-01 RX ADMIN — ONDANSETRON HYDROCHLORIDE 4 MG: 4 TABLET, FILM COATED ORAL at 10:50

## 2022-01-01 RX ADMIN — BUDESONIDE AND FORMOTEROL FUMARATE DIHYDRATE 2 PUFF: 80; 4.5 AEROSOL RESPIRATORY (INHALATION) at 20:13

## 2022-01-01 RX ADMIN — ACETAMINOPHEN 650 MG: 325 TABLET, FILM COATED ORAL at 20:25

## 2022-01-01 RX ADMIN — BUPROPION HYDROCHLORIDE 300 MG: 300 TABLET, EXTENDED RELEASE ORAL at 08:34

## 2022-01-01 RX ADMIN — BUPROPION HYDROCHLORIDE 300 MG: 300 TABLET, EXTENDED RELEASE ORAL at 09:54

## 2022-01-01 RX ADMIN — MONTELUKAST SODIUM 10 MG: 10 TABLET, FILM COATED ORAL at 20:02

## 2022-01-01 RX ADMIN — ATORVASTATIN CALCIUM 10 MG: 20 TABLET, FILM COATED ORAL at 20:02

## 2022-01-01 RX ADMIN — ANASTROZOLE 1 MG: 1 TABLET ORAL at 10:09

## 2022-01-01 RX ADMIN — LEVOTHYROXINE SODIUM 50 MCG: 0.05 TABLET ORAL at 15:36

## 2022-01-01 RX ADMIN — LEVOTHYROXINE SODIUM 50 MCG: 0.05 TABLET ORAL at 08:34

## 2022-01-01 RX ADMIN — MIRABEGRON 50 MG: 25 TABLET, FILM COATED, EXTENDED RELEASE ORAL at 09:54

## 2022-01-01 RX ADMIN — BUPROPION HYDROCHLORIDE 300 MG: 300 TABLET, EXTENDED RELEASE ORAL at 09:14

## 2022-01-01 RX ADMIN — ACETAMINOPHEN 650 MG: 325 TABLET, FILM COATED ORAL at 03:48

## 2022-01-01 RX ADMIN — ACETAMINOPHEN 650 MG: 325 TABLET, FILM COATED ORAL at 08:34

## 2022-01-01 RX ADMIN — BUDESONIDE AND FORMOTEROL FUMARATE DIHYDRATE 2 PUFF: 80; 4.5 AEROSOL RESPIRATORY (INHALATION) at 20:08

## 2022-01-01 RX ADMIN — BUPROPION HYDROCHLORIDE 300 MG: 300 TABLET, EXTENDED RELEASE ORAL at 15:36

## 2022-01-01 RX ADMIN — LEVOTHYROXINE SODIUM 50 MCG: 0.05 TABLET ORAL at 09:15

## 2022-01-01 RX ADMIN — VENLAFAXINE HYDROCHLORIDE 150 MG: 150 CAPSULE, EXTENDED RELEASE ORAL at 12:17

## 2022-01-01 RX ADMIN — MIRABEGRON 50 MG: 25 TABLET, FILM COATED, EXTENDED RELEASE ORAL at 10:10

## 2022-01-01 RX ADMIN — BUDESONIDE AND FORMOTEROL FUMARATE DIHYDRATE 2 PUFF: 80; 4.5 AEROSOL RESPIRATORY (INHALATION) at 08:10

## 2022-01-01 RX ADMIN — SODIUM CHLORIDE 100 ML/HR: 9 INJECTION, SOLUTION INTRAVENOUS at 06:10

## 2022-01-01 RX ADMIN — ONDANSETRON 4 MG: 2 INJECTION INTRAMUSCULAR; INTRAVENOUS at 01:45

## 2022-01-01 RX ADMIN — LEVOTHYROXINE SODIUM 50 MCG: 0.05 TABLET ORAL at 09:54

## 2022-01-01 RX ADMIN — ANASTROZOLE 1 MG: 1 TABLET ORAL at 09:15

## 2022-01-01 RX ADMIN — LISINOPRIL 5 MG: 5 TABLET ORAL at 20:21

## 2022-01-01 RX ADMIN — ATORVASTATIN CALCIUM 10 MG: 20 TABLET, FILM COATED ORAL at 20:20

## 2022-01-01 RX ADMIN — ONDANSETRON HYDROCHLORIDE 4 MG: 4 TABLET, FILM COATED ORAL at 09:54

## 2022-01-01 RX ADMIN — ERGOCALCIFEROL 50000 UNITS: 1.25 CAPSULE ORAL at 11:30

## 2022-01-01 RX ADMIN — Medication 2000 UNITS: at 15:36

## 2022-01-01 RX ADMIN — ATORVASTATIN CALCIUM 10 MG: 20 TABLET, FILM COATED ORAL at 21:24

## 2022-01-01 RX ADMIN — ACETAMINOPHEN 650 MG: 325 TABLET, FILM COATED ORAL at 20:02

## 2022-01-01 RX ADMIN — MONTELUKAST SODIUM 10 MG: 10 TABLET, FILM COATED ORAL at 21:24

## 2022-01-01 RX ADMIN — LISINOPRIL 5 MG: 5 TABLET ORAL at 10:08

## 2022-01-01 RX ADMIN — ONDANSETRON 4 MG: 2 INJECTION INTRAMUSCULAR; INTRAVENOUS at 09:00

## 2022-01-01 RX ADMIN — ACETAMINOPHEN 650 MG: 325 TABLET, FILM COATED ORAL at 20:20

## 2022-01-01 RX ADMIN — BUDESONIDE AND FORMOTEROL FUMARATE DIHYDRATE 2 PUFF: 80; 4.5 AEROSOL RESPIRATORY (INHALATION) at 08:07

## 2022-01-01 RX ADMIN — ANASTROZOLE 1 MG: 1 TABLET ORAL at 12:18

## 2022-01-01 RX ADMIN — MIRABEGRON 50 MG: 25 TABLET, FILM COATED, EXTENDED RELEASE ORAL at 09:16

## 2022-01-01 RX ADMIN — ONDANSETRON 4 MG: 2 INJECTION INTRAMUSCULAR; INTRAVENOUS at 03:49

## 2022-01-01 RX ADMIN — Medication 2000 UNITS: at 10:09

## 2022-01-01 RX ADMIN — ATORVASTATIN CALCIUM 10 MG: 20 TABLET, FILM COATED ORAL at 20:21

## 2022-01-01 RX ADMIN — BUPROPION HYDROCHLORIDE 300 MG: 300 TABLET, EXTENDED RELEASE ORAL at 10:09

## 2022-01-01 RX ADMIN — LISINOPRIL 5 MG: 5 TABLET ORAL at 20:20

## 2022-01-01 RX ADMIN — LISINOPRIL 5 MG: 5 TABLET ORAL at 09:15

## 2022-01-01 RX ADMIN — LIDOCAINE 1 APPLICATION: 40 CREAM TOPICAL at 13:37

## 2022-01-01 RX ADMIN — VENLAFAXINE HYDROCHLORIDE 150 MG: 150 CAPSULE, EXTENDED RELEASE ORAL at 09:15

## 2022-01-01 RX ADMIN — ACETAMINOPHEN 650 MG: 325 TABLET, FILM COATED ORAL at 09:06

## 2022-01-01 RX ADMIN — VENLAFAXINE HYDROCHLORIDE 150 MG: 150 CAPSULE, EXTENDED RELEASE ORAL at 10:09

## 2022-01-01 RX ADMIN — Medication 10 ML: at 21:24

## 2022-01-01 RX ADMIN — LISINOPRIL 5 MG: 5 TABLET ORAL at 09:54

## 2022-01-01 RX ADMIN — MIRABEGRON 50 MG: 25 TABLET, FILM COATED, EXTENDED RELEASE ORAL at 12:18

## 2022-01-01 RX ADMIN — Medication 2000 UNITS: at 09:54

## 2022-01-01 RX ADMIN — BUDESONIDE AND FORMOTEROL FUMARATE DIHYDRATE 2 PUFF: 80; 4.5 AEROSOL RESPIRATORY (INHALATION) at 11:28

## 2022-01-01 RX ADMIN — BUDESONIDE AND FORMOTEROL FUMARATE DIHYDRATE 2 PUFF: 80; 4.5 AEROSOL RESPIRATORY (INHALATION) at 08:26

## 2022-08-10 PROBLEM — R73.02 GLUCOSE INTOLERANCE (IMPAIRED GLUCOSE TOLERANCE): Status: ACTIVE | Noted: 2022-01-01

## 2022-08-10 PROBLEM — E03.9 ACQUIRED HYPOTHYROIDISM: Status: ACTIVE | Noted: 2022-01-01

## 2022-08-10 PROBLEM — Z90.12 S/P LEFT MASTECTOMY: Status: ACTIVE | Noted: 2018-01-23

## 2022-08-10 NOTE — PROGRESS NOTES
The ABCs of the Annual Wellness Visit  Subsequent Medicare Wellness Visit    Chief Complaint   Patient presents with   • Annual Exam     SUB AWV      Subjective    History of Present Illness:  Rut Wright is a 82 y.o. female who presents for a Subsequent Medicare Wellness Visit.  82-year-old white female here for subsequent Medicare wellness visit as well as for further medical management of hyperlipidemia, vitamin D deficiency as well as hypothyroidism and glucose intolerance.  She has a new complaint of urinary incontinence.  Apparently this is severe and tends to occur with multitudes of activity.  She states that if she has a full bladder and begins to move she just leaks.  She is status post left mastectomy for breast cancer and is been doing well.  There is also depression with anxiety features.  She has been  almost 50 years and finds her  to be critical in his old age.  She is often in a position where she cannot do anything right and the skin is difficult especially as he has begun to lose some of his memory.  She also has generalized osteoarthritis at multiple levels.  Medications are multiple and are as listed.  All medications are used appropriately and are well-tolerated without side effects.  Fasting labs have been acquired prior to this visit.    The following portions of the patient's history were reviewed and   updated as appropriate: allergies, current medications, past family history, past medical history, past social history, past surgical history and problem list.    Compared to one year ago, the patient feels her physical   health is worse.    Compared to one year ago, the patient feels her mental   health is worse.    Recent Hospitalizations:  She was not admitted to the hospital during the last year.       Current Medical Providers:  Patient Care Team:  Pipo Guillermo,  as PCP - General    Outpatient Medications Prior to Visit   Medication Sig Dispense Refill   •  anastrozole (ARIMIDEX) 1 MG tablet Take  by mouth Daily.     • Apoaequorin (Prevagen) 10 MG capsule Take  by mouth.     • Cholecalciferol (VITAMIN D3) 50 MCG (2000 UT) capsule Take 2,000 Units by mouth Daily.     • Fluticasone Furoate-Vilanterol (Breo Ellipta) 100-25 MCG/INH inhaler Inhale 1 puff Daily. 1 inhaler 5   • meclizine (ANTIVERT) 25 MG tablet TAKE ONE TABLET BY MOUTH THREE TIMES A DAY AS NEEDED FOR DIZZINESS 30 tablet 0   • melatonin 5 MG tablet tablet Take 5 mg by mouth.     • ondansetron (Zofran) 4 MG tablet Take 1 tablet by mouth Every 8 (Eight) Hours As Needed for Nausea or Vomiting. 30 tablet 0   • PROAIR  (90 BASE) MCG/ACT inhaler INHALE TWO PUFFS BY MOUTH EVERY 4 HOURS AS NEEDED 8.5 g 1   • buPROPion XL (Wellbutrin XL) 300 MG 24 hr tablet Take 1 tablet by mouth Daily. 30 tablet 1   • etodolac (LODINE) 400 MG tablet TAKE ONE TABLET BY MOUTH DAILY AS NEEDED PT NEEDS TO SEE DR CASILLAS WITH LABS PRIOR 15 tablet 0   • ezetimibe-simvastatin (VYTORIN) 10-20 MG per tablet Take 1 tablet by mouth Every Night. PT MUST SEE DR CASILLAS FOR FUTURE REFILLS 15 tablet 0   • montelukast (SINGULAIR) 10 MG tablet Take 1 tablet by mouth Every Night. PT MUST SEE DR CASILLAS FOR FUTURE REFILLS 30 tablet 0   • venlafaxine XR (EFFEXOR-XR) 150 MG 24 hr capsule Take 1 capsule by mouth Daily. PT MUST SEE DR CASILLAS FOR FUTURE REFILLS 15 capsule 0   • Fluzone High-Dose Quadrivalent 0.7 ML suspension prefilled syringe injection        No facility-administered medications prior to visit.       No opioid medication identified on active medication list. I have reviewed chart for other potential  high risk medication/s and harmful drug interactions in the elderly.          Aspirin is not on active medication list.  Aspirin use is not indicated based on review of current medical condition/s. Risk of harm outweighs potential benefits.  .    Patient Active Problem List   Diagnosis   • Mixed hyperlipidemia   • Perennial allergic  "rhinitis   • Vitamin D deficiency   • Mixed anxiety depressive disorder   • Generalized osteoarthritis   • Bunion, right   • Arthritis of right hand   • Malignant neoplasm of nipple of left breast in female, estrogen receptor positive (HCC)   • Moderate persistent asthma without complication   • De Quervain's tenosynovitis, left   • S/P left mastectomy   • Glucose intolerance (impaired glucose tolerance)   • Acquired hypothyroidism     Advance Care Planning  Advance Directive is not on file.  ACP discussion was held with the patient during this visit. Patient does not have an advance directive, information provided.    Review of Systems   Respiratory:        Left breast cancer   Cardiovascular:        Hyperlipidemia   Endocrine:        Glucose intolerance, vitamin D deficiency, hypothyroidism   Musculoskeletal: Positive for arthralgias and back pain.   Allergic/Immunologic: Positive for environmental allergies.   Psychiatric/Behavioral: Positive for dysphoric mood and sleep disturbance. The patient is nervous/anxious.         Objective    Vitals:    08/10/22 1320   BP: (!) 192/88   Pulse: 82   Temp: 98 °F (36.7 °C)   SpO2: 93%   Weight: 71.2 kg (157 lb)   Height: 157.5 cm (62\")     Estimated body mass index is 28.72 kg/m² as calculated from the following:    Height as of this encounter: 157.5 cm (62\").    Weight as of this encounter: 71.2 kg (157 lb).    BMI is >= 25 and <30. (Overweight) The following options were offered after discussion;: exercise counseling/recommendations and nutrition counseling/recommendations      Does the patient have evidence of cognitive impairment? No    Physical Exam  Vitals and nursing note reviewed.   Constitutional:       Appearance: Normal appearance. She is well-developed, well-groomed and overweight.   HENT:      Head: Normocephalic and atraumatic.   Neck:      Thyroid: No thyroid mass or thyromegaly.      Vascular: Normal carotid pulses. No carotid bruit.      Trachea: Trachea " and phonation normal.   Cardiovascular:      Rate and Rhythm: Normal rate and regular rhythm.      Heart sounds: Normal heart sounds. No murmur heard.    No friction rub. No gallop.   Pulmonary:      Effort: Pulmonary effort is normal. No respiratory distress.      Breath sounds: Normal breath sounds. No decreased breath sounds, wheezing, rhonchi or rales.   Musculoskeletal:      Cervical back: Neck supple.   Lymphadenopathy:      Cervical: No cervical adenopathy.   Skin:     General: Skin is warm and dry.      Findings: No rash.   Neurological:      Mental Status: She is alert and oriented to person, place, and time.   Psychiatric:         Attention and Perception: Attention and perception normal.         Mood and Affect: Mood and affect normal.         Speech: Speech normal.         Behavior: Behavior normal. Behavior is cooperative.         Thought Content: Thought content normal.         Cognition and Memory: Cognition and memory normal.         Judgment: Judgment normal.       No visits with results within 6 Week(s) from this visit.   Latest known visit with results is:   Orders Only on 12/09/2021   Component Date Value Ref Range Status   • WBC 12/13/2021 7.3  3.4 - 10.8 x10E3/uL Final   • RBC 12/13/2021 4.91  3.77 - 5.28 x10E6/uL Final   • Hemoglobin 12/13/2021 14.2  11.1 - 15.9 g/dL Final   • Hematocrit 12/13/2021 42.9  34.0 - 46.6 % Final   • MCV 12/13/2021 87  79 - 97 fL Final   • MCH 12/13/2021 28.9  26.6 - 33.0 pg Final   • MCHC 12/13/2021 33.1  31.5 - 35.7 g/dL Final   • RDW 12/13/2021 13.2  11.7 - 15.4 % Final   • Platelets 12/13/2021 304  150 - 450 x10E3/uL Final   • Neutrophil Rel % 12/13/2021 52  Not Estab. % Final   • Lymphocyte Rel % 12/13/2021 30  Not Estab. % Final   • Monocyte Rel % 12/13/2021 8  Not Estab. % Final   • Eosinophil Rel % 12/13/2021 9  Not Estab. % Final   • Basophil Rel % 12/13/2021 1  Not Estab. % Final   • Neutrophils Absolute 12/13/2021 3.8  1.4 - 7.0 x10E3/uL Final   •  Lymphocytes Absolute 12/13/2021 2.2  0.7 - 3.1 x10E3/uL Final   • Monocytes Absolute 12/13/2021 0.6  0.1 - 0.9 x10E3/uL Final   • Eosinophils Absolute 12/13/2021 0.7 (A) 0.0 - 0.4 x10E3/uL Final   • Basophils Absolute 12/13/2021 0.1  0.0 - 0.2 x10E3/uL Final   • Immature Granulocyte Rel % 12/13/2021 0  Not Estab. % Final   • Immature Grans Absolute 12/13/2021 0.0  0.0 - 0.1 x10E3/uL Final   • TSH 12/13/2021 5.560 (A) 0.450 - 4.500 uIU/mL Final   • 25 Hydroxy, Vitamin D 12/13/2021 37.3  30.0 - 100.0 ng/mL Final    Comment: Vitamin D deficiency has been defined by the [place] of  Medicine and an Endocrine Society practice guideline as a  level of serum 25-OH vitamin D less than 20 ng/mL (1,2).  The Endocrine Society went on to further define vitamin D  insufficiency as a level between 21 and 29 ng/mL (2).  1. IOM ([place] of [employer]). 2010. Dietary reference     intakes for calcium and D. [place]: The     National Academies Press.  2. Jose M MF, Thom NC, Xenia SANCHEZ, et al.     Evaluation, treatment, and prevention of vitamin D     deficiency: an Endocrine Society clinical practice     guideline. JCEM. 2011 Jul; 96(7):1911-30.     • Glucose 12/13/2021 100 (A) 65 - 99 mg/dL Final   • BUN 12/13/2021 12  8 - 27 mg/dL Final   • Creatinine 12/13/2021 0.80  0.57 - 1.00 mg/dL Final   • eGFR Non  Am 12/13/2021 70  >59 mL/min/1.73 Final   • eGFR African Am 12/13/2021 81  >59 mL/min/1.73 Final    Comment: **In accordance with recommendations from the NKF-ASN Task force,**    [facility] is in the process of updating its eGFR calculation to the    2021 CKD-EPI creatinine equation that estimates kidney function    without a race variable.     • BUN/Creatinine Ratio 12/13/2021 15  12 - 28 Final   • Sodium 12/13/2021 140  134 - 144 mmol/L Final   • Potassium 12/13/2021 4.0  3.5 - 5.2 mmol/L Final   • Chloride 12/13/2021 104  96 - 106 mmol/L Final   • Total CO2 12/13/2021 25  20 - 29 mmol/L Final   • Calcium  12/13/2021 9.8  8.7 - 10.3 mg/dL Final   • Total Protein 12/13/2021 6.7  6.0 - 8.5 g/dL Final   • Albumin 12/13/2021 4.4  3.7 - 4.7 g/dL Final   • Globulin 12/13/2021 2.3  1.5 - 4.5 g/dL Final   • A/G Ratio 12/13/2021 1.9  1.2 - 2.2 Final   • Total Bilirubin 12/13/2021 0.4  0.0 - 1.2 mg/dL Final   • Alkaline Phosphatase 12/13/2021 99  44 - 121 IU/L Final                  **Please note reference interval change**   • AST (SGOT) 12/13/2021 23  0 - 40 IU/L Final   • ALT (SGPT) 12/13/2021 19  0 - 32 IU/L Final   • Total Cholesterol 12/13/2021 165  100 - 199 mg/dL Final   • Triglycerides 12/13/2021 188 (A) 0 - 149 mg/dL Final   • HDL Cholesterol 12/13/2021 69  >39 mg/dL Final   • VLDL Cholesterol Terell 12/13/2021 31  5 - 40 mg/dL Final   • LDL Chol Calc (NIH) 12/13/2021 65  0 - 99 mg/dL Final                 HEALTH RISK ASSESSMENT    Smoking Status:  Social History     Tobacco Use   Smoking Status Never Smoker   Smokeless Tobacco Never Used     Alcohol Consumption:  Social History     Substance and Sexual Activity   Alcohol Use No     Fall Risk Screen:    STEADI Fall Risk Assessment was completed, and patient is at HIGH risk for falls. Assessment completed on:8/10/2022    Depression Screening:  PHQ-2/PHQ-9 Depression Screening 8/10/2022   Retired PHQ-9 Total Score -   Retired Total Score -   Little Interest or Pleasure in Doing Things 0-->not at all   Feeling Down, Depressed or Hopeless 1-->several days   PHQ-9: Brief Depression Severity Measure Score 1       Health Habits and Functional and Cognitive Screening:  Functional & Cognitive Status 8/10/2022   Do you have difficulty preparing food and eating? No   Do you have difficulty bathing yourself, getting dressed or grooming yourself? No   Do you have difficulty using the toilet? No   Do you have difficulty moving around from place to place? No   Do you have trouble with steps or getting out of a bed or a chair? No   Current Diet Unhealthy Diet        Current Diet Comment  she hasnt been eating very healthy   Exercise (times per week) 0 times per week   Current Exercises Include No Regular Exercise   Do you need help using the phone?  No   Are you deaf or do you have serious difficulty hearing?  No   Do you need help with transportation? No   Do you need help shopping? No   Do you need help preparing meals?  No   Do you need help with housework?  No   Do you need help with laundry? No   Do you need help taking your medications? No   Do you need help managing money? No   Do you ever drive or ride in a car without wearing a seat belt? No   Have you felt unusual stress, anger or loneliness in the last month? No   Who do you live with? Spouse   If you need help, do you have trouble finding someone available to you? No   Do you have difficulty concentrating, remembering or making decisions? No       Age-appropriate Screening Schedule:  Refer to the list below for future screening recommendations based on patient's age, sex and/or medical conditions. Orders for these recommended tests are listed in the plan section. The patient has been provided with a written plan.    Health Maintenance   Topic Date Due   • ZOSTER VACCINE (1 of 2) Never done   • DXA SCAN  06/23/2022   • INFLUENZA VACCINE  10/01/2022   • LIPID PANEL  12/13/2022   • MAMMOGRAM  03/15/2023   • TDAP/TD VACCINES (3 - Td or Tdap) 10/28/2031              Assessment & Plan   CMS Preventative Services Quick Reference  Risk Factors Identified During Encounter  Cardiovascular Disease  Chronic Pain   Dementia/Memory   Depression/Dysphoria  Fall Risk-High or Moderate  Hearing Problem  Immunizations Discussed/Encouraged (specific Immunizations; Influenza, Prevnar 20 (Pneumococcal 20-valent conjugate) and Shingrix  Inactivity/Sedentary  Obesity/Overweight   Polypharmacy  The above risks/problems have been discussed with the patient.  Follow up actions/plans if indicated are seen below in the Assessment/Plan Section.  Pertinent information  has been shared with the patient in the After Visit Summary.    Diagnoses and all orders for this visit:    1. Medicare annual wellness visit, subsequent (Primary)  -     Lipid panel  -     Hemoglobin A1c  -     Comprehensive metabolic panel  -     Vitamin D 25 hydroxy  -     TSH  -     CBC w AUTO Differential    2. Mixed hyperlipidemia  -     Lipid panel  -     Hemoglobin A1c  -     Comprehensive metabolic panel  -     Vitamin D 25 hydroxy  -     TSH  -     CBC w AUTO Differential  -     ezetimibe-simvastatin (VYTORIN) 10-20 MG per tablet; Take 1 tablet by mouth Every Night.  Dispense: 90 tablet; Refill: 1    3. Vitamin D deficiency  -     Lipid panel  -     Hemoglobin A1c  -     Comprehensive metabolic panel  -     Vitamin D 25 hydroxy  -     TSH  -     CBC w AUTO Differential    4. Mixed anxiety depressive disorder  -     Lipid panel  -     Hemoglobin A1c  -     Comprehensive metabolic panel  -     Vitamin D 25 hydroxy  -     TSH  -     CBC w AUTO Differential  -     venlafaxine XR (EFFEXOR-XR) 150 MG 24 hr capsule; Take 1 capsule by mouth Daily.  Dispense: 90 capsule; Refill: 1  -     buPROPion XL (Wellbutrin XL) 300 MG 24 hr tablet; Take 1 tablet by mouth Daily.  Dispense: 90 tablet; Refill: 1    5. Moderate persistent asthma without complication  -     Lipid panel  -     Hemoglobin A1c  -     Comprehensive metabolic panel  -     Vitamin D 25 hydroxy  -     TSH  -     CBC w AUTO Differential  -     montelukast (SINGULAIR) 10 MG tablet; Take 1 tablet by mouth Every Night.  Dispense: 90 tablet; Refill: 1    6. Glucose intolerance (impaired glucose tolerance)  -     Lipid panel  -     Hemoglobin A1c  -     Comprehensive metabolic panel  -     Vitamin D 25 hydroxy  -     TSH  -     CBC w AUTO Differential    7. Acquired hypothyroidism  -     Lipid panel  -     Hemoglobin A1c  -     Comprehensive metabolic panel  -     Vitamin D 25 hydroxy  -     TSH  -     CBC w AUTO Differential  -     levothyroxine (Synthroid) 50  MCG tablet; Take 1 tablet by mouth Daily.  Dispense: 90 tablet; Refill: 1    8. Continuous leakage of urine  -     solifenacin (VESIcare) 10 MG tablet; Take 1 tablet by mouth Daily.  Dispense: 30 tablet; Refill: 5    9. Generalized osteoarthritis  -     etodolac (LODINE) 400 MG tablet; TAKE ONE TABLET BY MOUTH DAILY AS NEEDED  Dispense: 90 tablet; Refill: 1        Follow Up:   Return in about 6 months (around 2/10/2023) for Recheck.     An After Visit Summary and PPPS were made available to the patient.          I spent 30 minutes caring for Rut on this date of service. This time includes time spent by me in the following activities:preparing for the visit, reviewing tests, obtaining and/or reviewing a separately obtained history, performing a medically appropriate examination and/or evaluation , counseling and educating the patient/family/caregiver, ordering medications, tests, or procedures, referring and communicating with other health care professionals , documenting information in the medical record, independently interpreting results and communicating that information with the patient/family/caregiver and care coordination

## 2022-08-15 NOTE — TELEPHONE ENCOUNTER
Pt advised of change   Reason for call: Right ear and throat pain    Onset: Yesterday    Have you been treated in the past: No    Fine to leave detailed voice message: Yes     Comments: Patients mother calling stating patient has been complaining about ear and throat pain.   Patients mother states patient does not have any other symptoms.     Specific question of patient, if applicable:     Name of medication in question, if applicable:     Pharmacy Loaded: No     Is this immediate, if so please reach out to the triage RN team.

## 2022-08-30 PROBLEM — R41.82 ALTERED MENTAL STATUS, UNSPECIFIED ALTERED MENTAL STATUS TYPE: Status: ACTIVE | Noted: 2022-01-01

## 2022-08-30 PROBLEM — E86.0 DEHYDRATION: Status: ACTIVE | Noted: 2022-01-01

## 2022-08-30 PROBLEM — S80.12XA CONTUSION OF LEFT LOWER EXTREMITY: Status: ACTIVE | Noted: 2022-01-01

## 2022-08-30 NOTE — TELEPHONE ENCOUNTER
PATIENTS  IS REQUESTING A CALL BACK TO DISCUSS THE PATIENTS CURRENT CONDITION.     MARLENI STATES THAT THE PATIENT HAS EXPERIENCED SEVERAL FALLS IN THE LAST FEW DAYS, AND IS CURRENTLY ADMITTED AT [facility], [place], operated by [facility].     PLEASE ADVISE [phone]

## 2022-08-31 PROBLEM — R03.0 ELEVATED BLOOD PRESSURE READING: Status: ACTIVE | Noted: 2022-01-01

## 2022-09-01 NOTE — THERAPY TREATMENT NOTE
Patient Name: Rut Wright  : 1940    MRN: [id number]                              Today's Date: 2022       Admit Date: 2022    Visit Dx:     ICD-10-CM ICD-9-CM   1. Altered mental status, unspecified altered mental status type  R41.82 780.97   2. Fall, initial encounter  W19.XXXA E888.9   3. Closed head injury, initial encounter  S09.90XA 959.01   4. Immobility  Z74.09 799.89   5. Contusion of left lower extremity, initial encounter  S80.12XA 924.5     Patient Active Problem List   Diagnosis   • Mixed hyperlipidemia   • Perennial allergic rhinitis   • Vitamin D deficiency   • Mixed anxiety depressive disorder   • Generalized osteoarthritis   • Bunion, right   • Arthritis of right hand   • Malignant neoplasm of nipple of left breast in female, estrogen receptor positive (HCC)   • Moderate persistent asthma without complication   • De Quervain's tenosynovitis, left   • S/P left mastectomy   • Glucose intolerance (impaired glucose tolerance)   • Acquired hypothyroidism   • Altered mental status, unspecified altered mental status type   • Contusion of left lower extremity   • Dehydration   • Elevated blood pressure reading     Past Medical History:   Diagnosis Date   • Allergic    • Cancer (HCC)     Left breast   • Depression    • Hyperlipidemia    • Hypertension      Past Surgical History:   Procedure Laterality Date   • COLONOSCOPY N/A 2017    Procedure: COLONOSCOPY;  Surgeon: Araceli Miranda MD;  Location: [facility];  Service:    • ENDOSCOPY N/A 2017    Procedure: ESOPHAGOGASTRODUODENOSCOPY WITH BIOPSY;  Surgeon: Araceli Miranda MD;  Location: [facility];  Service:    • HYSTERECTOMY     • MASTECTOMY Left    • TONSILLECTOMY        General Information     Row Name 22 1206          Physical Therapy Time and Intention    Document Type therapy note (daily note)  -BERTA     Mode of Treatment individual therapy;physical therapy  -     Row Name 22 1206          General Information     Patient Profile Reviewed yes  -     Existing Precautions/Restrictions fall  -     Row Name 09/01/22 1206          Cognition    Orientation Status (Cognition) oriented to;person;situation  unclear if asked details about her admission  -     Row Name 09/01/22 1206          Safety Issues, Functional Mobility    Impairments Affecting Function (Mobility) balance;coordination;endurance/activity tolerance;strength  -           User Key  (r) = Recorded By, (t) = Taken By, (c) = Cosigned By    Initials Name Provider Type    Kelsie Conner PTA Physical Therapist Assistant               Mobility     Row Name 09/01/22 1206          Bed Mobility    Comment, (Bed Mobility) in chair  -     Row Name 09/01/22 1206          Sit-Stand Transfer    Sit-Stand [place] (Transfers) 2 person assist;minimum assist (75% patient effort);verbal cues;nonverbal cues (demo/gesture)  -     Assistive Device (Sit-Stand Transfers) walker, front-wheeled  -     Comment, (Sit-Stand Transfer) decr post lean, but still leans back on heels, educ pt on preventing future falls by keeping feet flat during tfers  -     Row Name 09/01/22 1206          Gait/Stairs (Locomotion)    [place] Level (Gait) 2 person assist;minimum assist (75% patient effort);verbal cues;nonverbal cues (demo/gesture)  -     Assistive Device (Gait) walker, front-wheeled  -     Distance in Feet (Gait) 35ft, assist to guide rx, cues to stay on task  -     Deviations/Abnormal Patterns (Gait) wilder decreased;stride length decreased;base of support, narrow;festinating/shuffling  -     Bilateral Gait Deviations forward flexed posture;heel strike decreased  -     Left Sided Gait Deviations heel strike decreased  -           User Key  (r) = Recorded By, (t) = Taken By, (c) = Cosigned By    Initials Name Provider Type    Kelsie Conner PTA Physical Therapist Assistant               Obj/Interventions     Row Name 09/01/22 1210          Motor Skills     Therapeutic Exercise --  fatigued from OT session recently, did not exer at this time  -           User Key  (r) = Recorded By, (t) = Taken By, (c) = Cosigned By    Initials Name Provider Type    Kelsie Conner PTA Physical Therapist Assistant               Goals/Plan    No documentation.                Clinical Impression     Row Name 09/01/22 1210          Pain    Pretreatment Pain Rating 0/10 - no pain  -     Posttreatment Pain Rating 0/10 - no pain  -     Row Name 09/01/22 1210          Plan of Care Review    Plan of Care Reviewed With patient;daughter  -     Progress improving  -     Outcome Evaluation Pt leydi incr amb dist 35ft min assist of 2, pt fatigued and with slight post lean this session, asisst to guide rwx, cues to stay centered in wx and cues to stay on task, pt plans SNU at [place]  -[facility] Name 09/01/22 1210          Therapy Assessment/Plan (PT)    Rehab Potential (PT) good, to achieve stated therapy goals  -     Criteria for Skilled Interventions Met (PT) yes  -[facility] Name 09/01/22 1210          Vital Signs    O2 Delivery Pre Treatment room air  -[facility] Name 09/01/22 1210          Positioning and Restraints    Pre-Treatment Position sitting in chair/recliner  -     Post Treatment Position chair  -JM     In Chair reclined;call light within reach;encouraged to call for assist;exit alarm on;with family/caregiver;notified nsg  -           User Key  (r) = Recorded By, (t) = Taken By, (c) = Cosigned By    Initials Name Provider Type    Kelsie Conner PTA Physical Therapist Assistant               Outcome Measures     Row Name 09/01/22 1213          How much help from another person do you currently need...    Turning from your back to your side while in flat bed without using bedrails? 3  -JM     Moving from lying on back to sitting on the side of a flat bed without bedrails? 3  -JM     Moving to and from a bed to a chair (including a wheelchair)? 3  -JM     Standing  up from a chair using your arms (e.g., wheelchair, bedside chair)? 3  -JM     Climbing 3-5 steps with a railing? 1  -JM     To walk in hospital room? 3  -JM     AM-PAC 6 Clicks Score (PT) 16  -BERTA     Highest level of mobility 5 --> Static standing  -BERTA           User Key  (r) = Recorded By, (t) = Taken By, (c) = Cosigned By    Initials Name Provider Type    Kelsie Conner PTA Physical Therapist Assistant                             Physical Therapy Education                 Title: PT OT SLP Therapies (In Progress)     Topic: Physical Therapy (In Progress)     Point: Mobility training (In Progress)     Learning Progress Summary           Patient Acceptance, E,D, NR by BERTA at 9/1/2022 1213    Acceptance, E,D, VU,NR by BERTA at 8/31/2022 1322    Acceptance, E, NR by  at 8/30/2022 1744   Family Acceptance, E,D, NR by BERTA at 9/1/2022 1213    Acceptance, E,D, VU,NR by BERTA at 8/31/2022 1322                   Point: Home exercise program (In Progress)     Learning Progress Summary           Patient Acceptance, E,D, NR by BERTA at 9/1/2022 1213    Acceptance, E,D, VU,NR by BERTA at 8/31/2022 1322   Family Acceptance, E,D, NR by BERTA at 9/1/2022 1213    Acceptance, E,D, VU,NR by  at 8/31/2022 1322                   Point: Body mechanics (In Progress)     Learning Progress Summary           Patient Acceptance, E,D, NR by BERTA at 9/1/2022 1213    Acceptance, E,D, VU,NR by BERTA at 8/31/2022 1322    Acceptance, E, NR by  at 8/30/2022 1744   Family Acceptance, E,D, NR by BERTA at 9/1/2022 1213    Acceptance, E,D, VU,NR by BERTA at 8/31/2022 1322                   Point: Precautions (In Progress)     Learning Progress Summary           Patient Acceptance, E,D, NR by BERTA at 9/1/2022 1213    Acceptance, E,D, VU,NR by BERTA at 8/31/2022 1322    Acceptance, E, NR by  at 8/30/2022 1744   Family Acceptance, E,D, NR by BERTA at 9/1/2022 1213    Acceptance, E,KONRAD, BALTAZAR,NR by BERTA at 8/31/2022 1322                               User Key     Initials Effective  Dates Name Provider Type Discipline     03/07/18 -  Kelsie Cohn PTA Physical Therapist Assistant PT    CF 06/16/21 -  Vicky Martin PT Physical Therapist PT              PT Recommendation and Plan     Plan of Care Reviewed With: patient, daughter  Progress: improving  Outcome Evaluation: Pt leydi incr amb dist 35ft min assist of 2, pt fatigued and with slight post lean this session, asisst to guide rwx, cues to stay centered in wx and cues to stay on task, pt plans SNU at [place]     Time Calculation:    PT Charges     Row Name 09/01/22 1214             Time Calculation    Start Time 1100  -      Stop Time 1112  -      Time Calculation (min) 12 min  -      PT Received On 09/01/22  -      PT - Next Appointment 09/02/22  -            User Key  (r) = Recorded By, (t) = Taken By, (c) = Cosigned By    Initials Name Provider Type    Kelsie Conner PTA Physical Therapist Assistant              Therapy Charges for Today     Code Description Service Date Service Provider Modifiers Qty    40764208481 HC PT THER PROC EA 15 MIN 8/31/2022 Kelsie Cohn PTA GP 2    47624716555 HC PT THER SUPP EA 15 MIN 8/31/2022 Kelsie Cohn, MEHUL GP 2    17826752120 HC PT THER PROC EA 15 MIN 9/1/2022 Kelsie Cohn, MEHUL GP 1    13989036279 HC PT THER SUPP EA 15 MIN 9/1/2022 Kelsie Cohn, MEHUL GP 1          PT G-Codes  Outcome Measure Options: AM-PAC 6 Clicks Daily Activity (OT)  AM-PAC 6 Clicks Score (PT): 16  AM-PAC 6 Clicks Score (OT): 14  Modified Sherman Scale: 4 - Moderately severe disability.  Unable to walk without assistance, and unable to attend to own bodily needs without assistance.    Kelsie Cohn PTA  9/1/2022

## 2022-09-01 NOTE — PROGRESS NOTES
Continued Stay Note  [facility]     Patient Name: Rut Wright  MRN: [id number]  Today's Date: 9/1/2022    Admit Date: 8/30/2022     Discharge Plan     Row Name 09/01/22 1153       Plan    [facility]    Plan Comments Spoke with Fawn, cert IS approved for dc to [facility]; bed is ready. OSIEL Spangler notified, no dc today d/t bp and med adjustments; possible dc tomorrow. Fawn with [facility] notified. CCP will f/u 9/2. Anticipate family or  van transport to rehab. [facility]'s pharmacy is selected in [place].               Discharge Codes    No documentation.               Expected Discharge Date and Time     Expected Discharge Date Expected Discharge Time    Sep 2, 2022             Carol Alves RN

## 2022-09-01 NOTE — PROGRESS NOTES
Name: Rut Wright ADMIT: 2022   : 1940  PCP: Pipo Guillermo DO    MRN: [id number] LOS: 1 days   AGE/SEX: 82 y.o. female  ROOM: [facility]     Subjective   Subjective    she is feeling much better.  Still sore from her falls.  No focal neurologic deficits.  No chest pain or shortness of breath.  No fever or chills.  No infectious symptoms.  No LUTS.    Review of Systems     Objective   Objective   Vital Signs  Temp:  [97 °F (36.1 °C)-98.2 °F (36.8 °C)] 97.8 °F (36.6 °C)  Heart Rate:  [80-84] 84  Resp:  [16-18] 16  BP: (154-188)/(83-93) 160/83  SpO2:  [94 %-99 %] 97 %  on   ;   Device (Oxygen Therapy): room air  Body mass index is 29.26 kg/m².  Physical Exam  Vitals reviewed.   Constitutional:       Appearance: Normal appearance. She is well-developed.   HENT:      Head: Normocephalic and atraumatic.   Cardiovascular:      Rate and Rhythm: Normal rate and regular rhythm.   Pulmonary:      Effort: Pulmonary effort is normal. No respiratory distress.      Breath sounds: Normal breath sounds.   Abdominal:      General: Bowel sounds are normal. There is no distension.      Palpations: Abdomen is soft. There is no mass.      Tenderness: There is no abdominal tenderness.      Hernia: No hernia is present.   Skin:     General: Skin is warm and dry.      Comments: Ecchymosis    Neurological:      General: No focal deficit present.      Mental Status: She is alert and oriented to person, place, and time.   Psychiatric:         Mood and Affect: Mood normal.         Behavior: Behavior normal.         Thought Content: Thought content normal.       Results Review     I reviewed the patient's new clinical results.  Results from last 7 days   Lab Units 22  0526 22  0513   WBC 10*3/mm3 6.59 7.21   HEMOGLOBIN g/dL 12.4 12.5   PLATELETS 10*3/mm3 247 229     Results from last 7 days   Lab Units 22  0526 22  0513   SODIUM mmol/L 137 137   POTASSIUM mmol/L 4.0 4.1   CHLORIDE mmol/L 106 102   CO2  mmol/L 23.5 22.9   BUN mg/dL 7* 13   CREATININE mg/dL 0.75 1.03*   GLUCOSE mg/dL 126* 90   EGFR mL/min/1.73 80.1 54.7*     Results from last 7 days   Lab Units 08/30/22  0513   ALBUMIN g/dL 4.30   BILIRUBIN mg/dL 0.6   ALK PHOS U/L 96   AST (SGOT) U/L 14   ALT (SGPT) U/L 11     Results from last 7 days   Lab Units 08/31/22  0526 08/30/22  0513   CALCIUM mg/dL 9.0 10.0   ALBUMIN g/dL  --  4.30       No results found for: HGBA1C, POCGLU    No radiology results for the last day  Scheduled Medications  anastrozole, 1 mg, Oral, Daily  atorvastatin, 10 mg, Oral, Nightly  budesonide-formoterol, 2 puff, Inhalation, BID - RT  buPROPion XL, 300 mg, Oral, Daily  cholecalciferol, 2,000 Units, Oral, Daily  levothyroxine, 50 mcg, Oral, Daily  lisinopril, 5 mg, Oral, Q12H  Mirabegron ER, 50 mg, Oral, Daily  montelukast, 10 mg, Oral, Nightly  sodium chloride, 10 mL, Intravenous, Q12H  venlafaxine XR, 150 mg, Oral, Daily    Infusions   Diet  Diet Regular       Assessment/Plan     Active Hospital Problems    Diagnosis  POA   • Elevated blood pressure reading [R03.0]  Unknown   • Altered mental status, unspecified altered mental status type [R41.82]  Yes   • Contusion of left lower extremity [S80.12XA]  Yes   • Dehydration [E86.0]  Yes   • Acquired hypothyroidism [E03.9]  Yes   • Moderate persistent asthma without complication [J45.40]  Yes   • Mixed hyperlipidemia [E78.2]  Yes      Resolved Hospital Problems   No resolved problems to display.       82 y.o. female admitted with generalized weakness and recurrent falls.   MRI without acute change.  Suspect recurrent falls related to cognitive decline with baseline dementia that has been undiagnosed.  MRI and CT head reflect severe small vessel disease.  She had a very small fall getting out of the chair next to the bed.  Family bedside states she did not hurt her self or lose consciousness.    Dehydration resolved.  Off IV fluids    Blood pressure has been elevated with no prior  diagnosis of hypertension, wonder if this has been contributing to her falls at home.  Increase lisinopril 5 mg to twice daily given continued elevated blood pressures.  I would like to monitor her overnight.    Cautious to avoid post discharge hypotension, however, pressures remain SBP 180s     · SCDs for DVT prophylaxis.  · Full code.  · Discussed with patient, family, nursing staff and Dr Dang.  · Anticipate discharge to SNU facility  1-2days      OSIEL Rabago  [facility]  09/01/22  15:22 EDT

## 2022-09-01 NOTE — PLAN OF CARE
Goal Outcome Evaluation:  Plan of Care Reviewed With: patient        Progress: improving  Outcome Evaluation: Pt showing improvements today in balance, she was able to get up to bathroom CGA/Min A with rwx, slightly unsteady and small LOB during. Decreased balance and reaching to feet limiting and she requires mod A to don pull up brief and for toileting. She was able to work with OT on some standing reaching activites to increase safety with standing balance and independence with ADLs. OT recomminding SNF.    OT wore appropriate PPE and completed hand hygiene.

## 2022-09-01 NOTE — PLAN OF CARE
Goal Outcome Evaluation:  Plan of Care Reviewed With: patient, daughter        Progress: improving  Outcome Evaluation: VSS, pt had multiple attempts of trying to get OOB, purewick in place, on room air, falls precautions maintained, c/o nausea treated w/ zofran, daughter at bedside, plan is for possible d/c to [facility]

## 2022-09-01 NOTE — THERAPY TREATMENT NOTE
Patient Name: Rut Wright  : 1940    MRN: [id number]                              Today's Date: 2022       Admit Date: 2022    Visit Dx:     ICD-10-CM ICD-9-CM   1. Altered mental status, unspecified altered mental status type  R41.82 780.97   2. Fall, initial encounter  W19.XXXA E888.9   3. Closed head injury, initial encounter  S09.90XA 959.01   4. Immobility  Z74.09 799.89   5. Contusion of left lower extremity, initial encounter  S80.12XA 924.5     Patient Active Problem List   Diagnosis   • Mixed hyperlipidemia   • Perennial allergic rhinitis   • Vitamin D deficiency   • Mixed anxiety depressive disorder   • Generalized osteoarthritis   • Bunion, right   • Arthritis of right hand   • Malignant neoplasm of nipple of left breast in female, estrogen receptor positive (HCC)   • Moderate persistent asthma without complication   • De Quervain's tenosynovitis, left   • S/P left mastectomy   • Glucose intolerance (impaired glucose tolerance)   • Acquired hypothyroidism   • Altered mental status, unspecified altered mental status type   • Contusion of left lower extremity   • Dehydration   • Elevated blood pressure reading     Past Medical History:   Diagnosis Date   • Allergic    • Cancer (HCC)     Left breast   • Depression    • Hyperlipidemia    • Hypertension      Past Surgical History:   Procedure Laterality Date   • COLONOSCOPY N/A 2017    Procedure: COLONOSCOPY;  Surgeon: Araceli Miranda MD;  Location: [facility];  Service:    • ENDOSCOPY N/A 2017    Procedure: ESOPHAGOGASTRODUODENOSCOPY WITH BIOPSY;  Surgeon: Araceli Miranda MD;  Location: [facility];  Service:    • HYSTERECTOMY     • MASTECTOMY Left    • TONSILLECTOMY        General Information     Row Name 22 1218          OT Time and Intention    Document Type therapy note (daily note)  -SM     Mode of Treatment occupational therapy;individual therapy  -     Row Name 22 1218          General Information    Patient  Profile Reviewed yes  -     Existing Precautions/Restrictions fall  -[facility] Name 09/01/22 1218          Cognition    Orientation Status (Cognition) oriented to;person;place  -[facility] Name 09/01/22 1218          Safety Issues, Functional Mobility    Safety Issues Affecting Function (Mobility) --  generalized confusion noted and some preseveration on ADLs.  -     Impairments Affecting Function (Mobility) balance;endurance/activity tolerance;strength;cognition  -           User Key  (r) = Recorded By, (t) = Taken By, (c) = Cosigned By    Initials Name Provider Type     Joy Hopkins OT Occupational Therapist                 Mobility/ADL's     [facility] Name 09/01/22 1218          Bed Mobility    Supine-Sit [place] (Bed Mobility) contact guard  -     Assistive Device (Bed Mobility) bed rails;draw sheet;head of bed elevated  -[facility] Name 09/01/22 1218          Transfers    Sit-Stand [place] (Transfers) minimum assist (75% patient effort);verbal cues  -[facility] Name 09/01/22 1218          Sit-Stand Transfer    Assistive Device (Sit-Stand Transfers) walker, front-wheeled  -[facility] Name 09/01/22 1218          Functional Mobility    Functional Mobility- Ind. Level contact guard assist;minimum assist (75% patient effort)  -     Functional Mobility- Device walker, front-wheeled  -     Functional Mobility- Comment a few small LOB with mobility to bathroom.  -[facility] Name 09/01/22 1218          Activities of Daily Living    BADL Assessment/Intervention lower body dressing;grooming;toileting  -[facility] Name 09/01/22 1218          Lower Body Dressing Assessment/Training    [place] Level (Lower Body Dressing) lower body dressing skills;don;pants/bottoms;moderate assist (50% patient effort)  -     Position (Lower Body Dressing) supported sitting;supported standing  -[facility] Name 09/01/22 1218          Toileting Assessment/Training    [place] Level (Toileting) toileting  skills;moderate assist (50% patient effort);adjust/manage clothing;perform perineal hygiene  -     Row Name 09/01/22 1218          Grooming Assessment/Training    [place] Level (Grooming) grooming skills;contact guard assist;wash face, hands;oral care regimen  -     Position (Grooming) sink side;supported standing  was finishing up oral care in bed at start of session  -           User Key  (r) = Recorded By, (t) = Taken By, (c) = Cosigned By    Initials Name Provider Type    Joy Hayes OT Occupational Therapist               Obj/Interventions     Row Name 09/01/22 1220          Balance    Static Sitting Balance standby assist  -     Position, Sitting Balance sitting edge of bed  -     Static Standing Balance contact guard  -     Dynamic Standing Balance minimal assist;contact guard  -     Position/Device Used, Standing Balance supported;walker, rolling  -     Balance Interventions sitting;dynamic reaching  -     Comment, Balance standing UE reaching X10 reps reach UE reaching above shoulder level with one hand support on rwx at all times.  -           User Key  (r) = Recorded By, (t) = Taken By, (c) = Cosigned By    Initials Name Provider Type     Joy Hopkins OT Occupational Therapist               Goals/Plan    No documentation.                Clinical Impression     Row Name 09/01/22 1221          Pain Assessment    Pretreatment Pain Rating 0/10 - no pain  -     Posttreatment Pain Rating 0/10 - no pain  -     Row Name 09/01/22 1221          Plan of Care Review    Plan of Care Reviewed With patient  -     Progress improving  -     Outcome Evaluation Pt showing improvements today in balance, she was able to get up to bathroom CGA/Min A with rwx, slightly unsteady and small LOB during. Decreased balance and reaching to feet limiting and she requires mod A to don pull up brief and for toileting. She was able to work with OT on some standing reaching activites to  increase safety with standing balance and independence with ADLs. OT recomminding SNF.  -     Row Name 09/01/22 1221          Therapy Plan Review/Discharge Plan (OT)    Anticipated Discharge Disposition (OT) skilled nursing facility  -     Row Name 09/01/22 1221          Positioning and Restraints    Pre-Treatment Position in bed  -SM     Post Treatment Position chair  -SM     In Chair reclined;call light within reach;encouraged to call for assist;exit alarm on;notified nsg  -           User Key  (r) = Recorded By, (t) = Taken By, (c) = Cosigned By    Initials Name Provider Type    Joy Hayes, CARLOS ALBERTO Occupational Therapist               Outcome Measures     Row Name 09/01/22 1223          How much help from another is currently needed...    Putting on and taking off regular lower body clothing? 2  -SM     Bathing (including washing, rinsing, and drying) 2  -SM     Toileting (which includes using toilet bed pan or urinal) 2  -SM     Putting on and taking off regular upper body clothing 3  -SM     Taking care of personal grooming (such as brushing teeth) 3  -SM     Eating meals 4  -SM     AM-PAC 6 Clicks Score (OT) 16  -SM     Row Name 09/01/22 1213          How much help from another person do you currently need...    Turning from your back to your side while in flat bed without using bedrails? 3  -JM     Moving from lying on back to sitting on the side of a flat bed without bedrails? 3  -JM     Moving to and from a bed to a chair (including a wheelchair)? 3  -JM     Standing up from a chair using your arms (e.g., wheelchair, bedside chair)? 3  -JM     Climbing 3-5 steps with a railing? 1  -JM     To walk in hospital room? 3  -JM     AM-PAC 6 Clicks Score (PT) 16  -JM     Highest level of mobility 5 --> Static standing  -     Row Name 09/01/22 1223          Functional Assessment    Outcome Measure Options AM-PAC 6 Clicks Daily Activity (OT)  -           User Key  (r) = Recorded By, (t) = Taken By, (c)  = Cosigned By    Initials Name Provider Type    Kelsie Conner PTA Physical Therapist Assistant    Joy Hayes, CARLOS ALBERTO Occupational Therapist                Occupational Therapy Education                 Title: PT OT SLP Therapies (In Progress)     Topic: Occupational Therapy (Done)     Point: ADL training (Done)     Description:   Instruct learner(s) on proper safety adaptation and remediation techniques during self care or transfers.   Instruct in proper use of assistive devices.              Learning Progress Summary           Patient Acceptance, E, VU,NR by  at 8/30/2022 1502    Comment: role of OT, d/c rec, therapy goals   Family Acceptance, E, VU,NR by  at 8/30/2022 1502    Comment: role of OT, d/c rec, therapy goals                   Point: Home exercise program (Done)     Description:   Instruct learner(s) on appropriate technique for monitoring, assisting and/or progressing therapeutic exercises/activities.              Learning Progress Summary           Patient Acceptance, E, VU,NR by  at 8/30/2022 1502    Comment: role of OT, d/c rec, therapy goals   Family Acceptance, E, VU,NR by  at 8/30/2022 1502    Comment: role of OT, d/c rec, therapy goals                   Point: Precautions (Done)     Description:   Instruct learner(s) on prescribed precautions during self-care and functional transfers.              Learning Progress Summary           Patient Acceptance, E, VU,NR by  at 8/30/2022 1502    Comment: role of OT, d/c rec, therapy goals   Family Acceptance, E, VU,NR by  at 8/30/2022 1502    Comment: role of OT, d/c rec, therapy goals                   Point: Body mechanics (Done)     Description:   Instruct learner(s) on proper positioning and spine alignment during self-care, functional mobility activities and/or exercises.              Learning Progress Summary           Patient Acceptance, E, VU,NR by  at 8/30/2022 1502    Comment: role of OT, d/c rec, therapy goals   Family  Acceptance, E, VU,NR by NICK at 8/30/2022 1502    Comment: role of OT, d/c rec, therapy goals                               User Key     Initials Effective Dates Name Provider Type Discipline     08/20/21 -  Tiffanie Mckeon OT Occupational Therapist OT              OT Recommendation and Plan     Plan of Care Review  Plan of Care Reviewed With: patient  Progress: improving  Outcome Evaluation: Pt showing improvements today in balance, she was able to get up to bathroom CGA/Min A with rwx, slightly unsteady and small LOB during. Decreased balance and reaching to feet limiting and she requires mod A to don pull up brief and for toileting. She was able to work with OT on some standing reaching activites to increase safety with standing balance and independence with ADLs. OT recomminding SNF.     Time Calculation:    Time Calculation- OT     Row Name 09/01/22 1224             Time Calculation- OT    OT Start Time 1016  -SM      OT Stop Time 1040  -SM      OT Time Calculation (min) 24 min  -SM      Total Timed Code Minutes- OT 24 minute(s)  -SM      OT Received On 09/01/22  -              Timed Charges    03964 - OT Therapeutic Activity Minutes 9  -SM      09124 - OT Self Care/Mgmt Minutes 15  -SM              Total Minutes    Timed Charges Total Minutes 24  -SM       Total Minutes 24  -SM            User Key  (r) = Recorded By, (t) = Taken By, (c) = Cosigned By    Initials Name Provider Type     Joy Hopkins OT Occupational Therapist              Therapy Charges for Today     Code Description Service Date Service Provider Modifiers Qty    55274910173 HC OT THERAPEUTIC ACT EA 15 MIN 9/1/2022 Joy Hopkins OT GO 1    60251843738 HC OT SELF CARE/MGMT/TRAIN EA 15 MIN 9/1/2022 Joy Hopkins OT GO 1               Joy Hopkins OT  9/1/2022

## 2022-09-01 NOTE — PLAN OF CARE
Goal Outcome Evaluation:  Plan of Care Reviewed With: patient, daughter        Progress: improving  Outcome Evaluation: Pt leydi incr amb dist 35ft min assist of 2, pt fatigued and with slight post lean this session, asisst to guide rwx, cues to stay centered in wx and cues to stay on task, pt plans SNU at [place]    Patient was wearing a face mask during this therapy encounter. Therapist used appropriate personal protective equipment including eye protection, mask, and gloves.  Mask used was standard procedure mask. Appropriate PPE was worn during the entire therapy session. Hand hygiene was completed before and after therapy session. Patient is not in enhanced droplet precautions.    Yvette Case, PT Tech present

## 2022-09-02 NOTE — PROGRESS NOTES
Continued Stay Note  [facility]     Patient Name: Rut Wright  MRN: [id number]  Today's Date: 9/2/2022    Admit Date: 8/30/2022     Discharge Plan     Row Name 09/02/22 1703       Plan    Plan [facility]    Plan Comments Patient will be DC tomorrow after Ortho sees patient. Informed Fawn with [facility] who stated patient is able to come over the weekend. Requested that all narcotic scripts are sent or faxed to the pharmacy. Updated RN               Discharge Codes    No documentation.               Expected Discharge Date and Time     Expected Discharge Date Expected Discharge Time    Sep 2, 2022             Linda Sargent, RN

## 2022-09-02 NOTE — PROGRESS NOTES
Continued Stay Note  [facility]     Patient Name: Rut Wright  MRN: [id number]  Today's Date: 9/2/2022    Admit Date: 8/30/2022     Discharge Plan     Row Name 09/02/22 0919       Plan    Plan [facility]    Plan Comments Msg sent to Fawn with [facility] who confirmed all is good for admit today to [facility]. Spoke to daughter at bedside who stated she will provide transportation at [place].               Discharge Codes    No documentation.               Expected Discharge Date and Time     Expected Discharge Date Expected Discharge Time    Sep 2, 2022             Linda Sargent RN

## 2022-09-02 NOTE — PLAN OF CARE
Goal Outcome Evaluation:  Plan of Care Reviewed With: patient        Progress: improving  Outcome Evaluation: vss, c/o lt knee pain medicated with tylenol, small bump noted on lt knee, bed alarm for safety, up with assist, d/c home to [facility] nursing Danese sebastián today, daugther at bedside, continue to monitor the pt.

## 2022-09-02 NOTE — PLAN OF CARE
Goal Outcome Evaluation:           Progress: no change  Outcome Evaluation: vss, c/o left wrist pain with movement, no swelling noted, old bruising noted. up in chair for several hours and leydi well leydi meals well. alert confusion unchanged. ortho to see in am

## 2022-09-02 NOTE — DISCHARGE SUMMARY
Patient Name: Rut Wright  : 1940  MRN: [id number]    Date of Admission: 2022  Date of Discharge:  2022  Primary Care Physician: Pipo Guillermo DO      Chief Complaint:   Fall, Facial Injury, and Altered Mental Status      Discharge Diagnoses     Active Hospital Problems    Diagnosis  POA   • Elevated blood pressure reading [R03.0]  Unknown   • Altered mental status, unspecified altered mental status type [R41.82]  Yes   • Contusion of left lower extremity [S80.12XA]  Yes   • Dehydration [E86.0]  Yes   • Acquired hypothyroidism [E03.9]  Yes   • Moderate persistent asthma without complication [J45.40]  Yes   • Mixed hyperlipidemia [E78.2]  Yes      Resolved Hospital Problems   No resolved problems to display.        Hospital Course     Ms. Wright is a 82 y.o. female with a history of hypothyroidism, HLD, undiagnosed dementia that presented with recurrent falls and confusion from home.  Her CT and MRI head was without acute intracranial process.  Numerous other imaging performed to rule out fracture or other complication from falls was negative.  She did have mild dehydration on admission was resolved with IV fluids.  She was noted to have persistently elevated blood pressure this admission without a prior diagnosis of hypertension.  This could have been contributing to her confusion and recurrent falls.  She has been started on lisinopril 5 mg twice daily.  Recommend continued management in the outpatient setting but no further adjustments while admitted to avoid post discharge hypotension.  Yesterday she did have a witnessed fall with family that was described as more of a sliding down to the floor.  He denied any physical complaints from that fall yesterday but today she complained of left wrist pain.  X-rays demonstrate possible fracture with an irregularity of the dorsal aspect of the radius on lateral view.  Follow-up CT is recommended.  Family would like orthopedic surgery to evaluate  the patient prior to discharge as opposed to follow-up in the outpatient setting..  We will defer CT imaging to orthopedic surgery.  She has been placed at subacute rehab with a bed available today.  She will be discharged pending orthopedic surgery evaluation.          Day of Discharge     Subjective:  Her left wrist is sore.  No other physical complaints.  Otherwise she feels okay today    Physical Exam:  Temp:  [97.3 °F (36.3 °C)-97.5 °F (36.4 °C)] 97.5 °F (36.4 °C)  Heart Rate:  [75-85] 85  Resp:  [16-20] 18  BP: (133-178)/(65-86) 133/65  Body mass index is 29.26 kg/m².  Physical Exam  Vitals and nursing note reviewed.   Constitutional:       Appearance: She is well-developed.   HENT:      Head: Normocephalic.   Eyes:      Pupils: Pupils are equal, round, and reactive to light.   Cardiovascular:      Rate and Rhythm: Normal rate and regular rhythm.      Heart sounds: Normal heart sounds.   Pulmonary:      Effort: Pulmonary effort is normal.      Breath sounds: Normal breath sounds.   Abdominal:      General: Bowel sounds are normal. There is no distension or abdominal bruit.      Palpations: Abdomen is soft. Abdomen is not rigid. There is no shifting dullness, fluid wave or mass.      Tenderness: There is no abdominal tenderness. There is no guarding. Negative signs include Barney's sign.      Hernia: No hernia is present. There is no hernia in the ventral area.   Genitourinary:     Rectum: No mass, tenderness, anal fissure, external hemorrhoid or internal hemorrhoid.   Musculoskeletal:      Left wrist: Tenderness and bony tenderness present. No swelling, deformity or effusion. Decreased range of motion.   Skin:     General: Skin is dry.   Neurological:      Mental Status: She is alert.   Psychiatric:         Behavior: Behavior normal.         Thought Content: Thought content normal.         Judgment: Judgment normal.         Consultants     Consult Orders (all) (From admission, onward)     Start     Ordered     09/02/22 1431  Inpatient Orthopedic Surgery Consult  Once        Specialty:  Orthopedic Surgery  Provider:  (Not yet assigned)    09/02/22 1430    08/30/22 0919  Inpatient Case Management  Consult  Once        Provider:  (Not yet assigned)    08/30/22 0918    08/30/22 0528  [facility] (on-call MD unless specified) Details  Once        Specialty:  Hospitalist  Provider:  (Not yet assigned)    08/30/22 0527              Procedures     * Surgery not found *      Imaging Results (All)     Procedure Component Value Units Date/Time    XR Wrist 3+ View Left [[id number]] Collected: 09/02/22 1409     Updated: 09/02/22 1409    Narrative:      LEFT WRIST:     HISTORY: Pain, fall.     FINDINGS: AP, lateral and oblique views of the left wrist demonstrates  moderate-to-severe degenerative disease involving the 1st MCP joint.  Moderate radiocarpal loss of joint space, as well as chondrocalcinosis  is appreciated. Cortical irregularity involving the dorsal aspect of the  radius is appreciated on the lateral view. A nondisplaced fracture  cannot be excluded. There is a step-off appreciated involving the  articular surface of the radius with approximately 2 mm of depression  laterally. Further evaluation could be performed with a CT examination  of the wrist.     The above information was called to patient's nurse at time of the  dictation.       MRI Brain Without Contrast [[id number]] Collected: 08/30/22 0826     Updated: 08/30/22 0833    Narrative:      MRI OF THE BRAIN WITHOUT CONTRAST 08/30/2022     CLINICAL HISTORY: Patient had a fall and has been confused and  disoriented since fall.     TECHNIQUE: Axial T1, FLAIR, fat-suppressed T2, axial diffusion and  gradient echo T2 and sagittal T1-weighted images were obtained of the  entire head.     COMPARISON: There are no prior MRIs of the brain for comparison. This  study is correlated to a noncontrast head CT earlier this morning, 2  hours ago, on 08/30/2022 at 5 AM.      FINDINGS: There are extensive patchy and confluent areas of T2 high  signal throughout the periventricular and subcortical white matter of  the cerebral hemispheres and involving the central pontine white matter  consistent with severe small vessel disease. The remainder of the brain  parenchyma is normal in signal intensity. Specifically no diffusion  weighted abnormality is seen with no acute infarct identified. On the  gradient echo T2-weighted images no acute or old blood breakdown  products are seen intracranially. There is mild diffuse cerebral  atrophy. The lateral and 3rd ventricles are prominent in size probably  on the basis of central volume loss or atrophy. I see no mass effect and  no midline shift. No extra-axial fluid collections are identified. The  paranasal sinuses, mastoid air cells and middle ear cavities are clear.  Good flow voids are demonstrated within the cerebral vessels and in the  dural venous sinuses. The calvarium and skull base are normal in  appearance. On the sagittal T1-weighted images there is soft tissue  thickening along the margins of the odontoid likely some pannus  formation. On the midline sagittal T1-weighted images there is  horizontal signal loss at the base of the odontoid that is probably  volume averaging artifact rather than a fracture cleft.       Impression:      1. No acute intracranial abnormality is seen.  2. There is extensive confluent T2 high signal throughout the cerebral  and central pontine white matter consistent with severe small vessel  disease. There is diffuse cerebral atrophy and the lateral and 3rd  ventricles are prominent size felt to be due to central volume loss or  atrophy. The remainder of the MRI of the brain itself is within normal  limits with no acute infarct identified and the etiology of the  patient's confusion is not established on this exam.  3. On the midline sagittal T1-weighted images there is soft tissue  thickening compatible  with pannus formation along the posterior margin  of the odontoid. There is horizontal signal loss at the base of the  odontoid that is likely volume averaging artifact rather than a fracture  through the base of the odontoid. If the patient has any symptoms  referable to the upper cervical spine, a CT scan of the cervical spine  could be obtained for a more comprehensive assessment to ensure that the  base of the odontoid is indeed intact.      Results and recommendations were communicated to Usman Dang MD,  the hospitalist taking care of the patient by telephone on 08/30/2022 at  7:50 AM.     This report was finalized on 8/30/2022 8:29 AM by Dr. Edil Khan M.D.       XR Hip With or Without Pelvis 2 - 3 View Left [[id number]] Collected: 08/30/22 0712     Updated: 08/30/22 0718    Narrative:      XR HIP W OR WO PELVIS 2-3 VIEW LEFT-     Clinical: Trauma     FINDINGS: There is left hip joint narrowing and mild subchondral  sclerosis. There is minimal periarticular bone hypertrophy. No AVN,  fracture or dislocation. The left hemipelvis and overlying soft tissues  within normal limits. Incidentally noted is right hip joint  degeneration, greater than the left.     CONCLUSION: Hip joint degeneration, no acute osseous or articular  abnormality.     This report was finalized on 8/30/2022 7:15 AM by Dr. Juan Mckeon M.D.       XR Knee 1 or 2 View Left [[id number]] Collected: 08/30/22 0658     Updated: 08/30/22 0658    Narrative:        Patient: SWATI MARCOS  Time Out: 06:57  Exam(s): FILM LEFT KNEE     EXAM:    XR Left Knee, 2 Views    CLINICAL HISTORY:     Reason for exam: trauma.    TECHNIQUE:    Two views of the left knee.    COMPARISON:    No relevant prior studies available.    FINDINGS:    Bones joints:  No evidence of acute fracture or dislocation.  Tiny   suprapatellar knee joint effusion.  Chondrocalcinosis.    Soft tissues:  Mild overlying soft tissue swelling.    IMPRESSION:       1.  No evidence of  acute fracture or dislocation.  2.  Tiny suprapatellar knee joint effusion.  3.  Chondrocalcinosis.  4.  Mild overlying soft tissue swelling.      Impression:          Electronically signed by Vincenzo Chiu MD on 08-30-22 at 0657    CT Head Without Contrast [[id number]] Collected: 08/30/22 0534     Updated: 08/30/22 0534    Narrative:        Patient: SWATI MARCOS  Time Out: 05:34  Exam(s): CT HEAD Without Contrast     EXAM:    CT Head Without Intravenous Contrast    CLINICAL HISTORY:     Reason for exam: Head trauma, minor (Age >= 65y).    TECHNIQUE:    Axial computed tomography images of the head brain without intravenous   contrast.  CTDI is 54.8 mGy and DLP is 1007.3 mGy-cm.  This CT exam was   performed according to the principle of ALARA (As Low As Reasonably   Achievable) by using one or more of the following dose reduction   techniques: automated exposure control, adjustment of the mA and or kV   according to patient size, and or use of iterative reconstruction   technique.    COMPARISON:    No relevant prior studies available.    FINDINGS:    Brain:  Age appropriate senescent changes.  No evidence of acute   transcortical infarct or acute intracranial hemorrhage.  Patchy and   confluent areas of decreased attenuation are seen involving the bilateral   periventricular and subcortical white matter, likely representing severe   microangiopathy.    Ventricles:  Unremarkable.  No ventriculomegaly.    Bones joints:  Unremarkable.  No acute fracture.    Soft tissues: Left periorbital soft tissue swelling.    Sinuses:  Unremarkable as visualized.  No acute sinusitis.    Mastoid air cells:  Unremarkable as visualized.  No mastoid effusion.    IMPRESSION:       1.  No evidence of acute intracranial pathology.  2.  Severe microangiopathy.      Impression:          Electronically signed by Vincenzo Chiu MD on 08-30-22 at 0534            Pertinent Labs     Results from last 7 days   Lab Units 08/31/22  0542  08/30/22  0513   WBC 10*3/mm3 6.59 7.21   HEMOGLOBIN g/dL 12.4 12.5   PLATELETS 10*3/mm3 247 229     Results from last 7 days   Lab Units 08/31/22  0526 08/30/22  0513   SODIUM mmol/L 137 137   POTASSIUM mmol/L 4.0 4.1   CHLORIDE mmol/L 106 102   CO2 mmol/L 23.5 22.9   BUN mg/dL 7* 13   CREATININE mg/dL 0.75 1.03*   GLUCOSE mg/dL 126* 90   EGFR mL/min/1.73 80.1 54.7*     Results from last 7 days   Lab Units 08/30/22  0513   ALBUMIN g/dL 4.30   BILIRUBIN mg/dL 0.6   ALK PHOS U/L 96   AST (SGOT) U/L 14   ALT (SGPT) U/L 11     Results from last 7 days   Lab Units 08/31/22  0526 08/30/22  0513   CALCIUM mg/dL 9.0 10.0   ALBUMIN g/dL  --  4.30       Results from last 7 days   Lab Units 08/30/22  0513   CK TOTAL U/L 129   TROPONIN T ng/mL <0.010           Invalid input(s): LDLCALC          Test Results Pending at Discharge       Discharge Details        Discharge Medications      ASK your doctor about these medications      Instructions Start Date   anastrozole 1 MG tablet  Commonly known as: ARIMIDEX   Oral, Daily      buPROPion  MG 24 hr tablet  Commonly known as: Wellbutrin XL   300 mg, Oral, Daily      etodolac 400 MG tablet  Commonly known as: LODINE   TAKE ONE TABLET BY MOUTH DAILY AS NEEDED      ezetimibe-simvastatin 10-20 MG per tablet  Commonly known as: VYTORIN   1 tablet, Oral, Nightly      Fluticasone Furoate-Vilanterol 100-25 MCG/INH inhaler  Commonly known as: Breo Ellipta   1 puff, Inhalation, Daily - RT      levothyroxine 50 MCG tablet  Commonly known as: Synthroid   50 mcg, Oral, Daily      meclizine 25 MG tablet  Commonly known as: ANTIVERT   TAKE ONE TABLET BY MOUTH THREE TIMES A DAY AS NEEDED FOR DIZZINESS      melatonin 5 MG tablet tablet   5 mg, Oral      Mirabegron ER 50 MG tablet sustained-release 24 hour 24 hr tablet  Commonly known as: MYRBETRIQ   50 mg, Oral, Daily      montelukast 10 MG tablet  Commonly known as: SINGULAIR   10 mg, Oral, Nightly      ondansetron 4 MG tablet  Commonly  known as: Zofran   4 mg, Oral, Every 8 Hours PRN      Prevagen 10 MG capsule  Generic drug: Apoaequorin   Oral      ProAir  (90 Base) MCG/ACT inhaler  Generic drug: albuterol sulfate HFA   INHALE TWO PUFFS BY MOUTH EVERY 4 HOURS AS NEEDED      venlafaxine  MG 24 hr capsule  Commonly known as: EFFEXOR-XR   150 mg, Oral, Daily      Vitamin D3 50 MCG (2000 UT) capsule   2,000 Units, Oral, Daily             Allergies   Allergen Reactions   • Penicillins Hives       Discharge Disposition:  Skilled Nursing Facility (DC - External)      Discharge Diet:  Diet Order   Procedures   • Diet Regular       Discharge Activity:       CODE STATUS:    Code Status and Medical Interventions:   Ordered at: 08/30/22 0538     Code Status (Patient has no pulse and is not breathing):    CPR (Attempt to Resuscitate)     Medical Interventions (Patient has pulse or is breathing):    Full Support       No future appointments.   Contact information for follow-up providers     Pipo Guillermo DO .    Specialty: Family Medicine  Contact information:  [address] [facility] [id number] [phone]                   Contact information for after-discharge care     Destination     [facility] .    Service: Skilled Nursing  Contact information:  [address] [facility] [address]  [phone]                             Time Spent on Discharge:  Greater than 37 minutes      OSIEL Rabago  [facility]  09/02/22  14:32 EDT

## 2022-09-03 NOTE — PROGRESS NOTES
Dedicated to Hospital Care    [phone]   LOS: 0 days     Name: Rut Wright  Age/Sex: 82 y.o. female  :  1940        PCP: Pipo Guillermo DO  Chief Complaint   Patient presents with   • Fall   • Facial Injury   • Altered Mental Status      Subjective   Date of Admission: 2022  Date of Discharge:  9/3/2022  General: No Fever or Chills, Cardiac: No Chest Pain or Palpitations, Resp: No Cough or SOA, GI: No Nausea, Vomiting, or Diarrhea and Other: No bleeding    anastrozole, 1 mg, Oral, Daily  atorvastatin, 10 mg, Oral, Nightly  budesonide-formoterol, 2 puff, Inhalation, BID - RT  buPROPion XL, 300 mg, Oral, Daily  cholecalciferol, 2,000 Units, Oral, Daily  levothyroxine, 50 mcg, Oral, Daily  lisinopril, 5 mg, Oral, Q12H  Mirabegron ER, 50 mg, Oral, Daily  montelukast, 10 mg, Oral, Nightly  sodium chloride, 10 mL, Intravenous, Q12H  venlafaxine XR, 150 mg, Oral, Daily  vitamin D, 50,000 Units, Oral, Once           Objective   Vital Signs  Temp:  [97.2 °F (36.2 °C)-98.1 °F (36.7 °C)] 97.2 °F (36.2 °C)  Heart Rate:  [78-85] 78  Resp:  [16-18] 16  BP: (133-169)/(65-89) 161/72  Body mass index is 29.26 kg/m².    Intake/Output Summary (Last 24 hours) at 9/3/2022 0823  Last data filed at 2022 0856  Gross per 24 hour   Intake --   Output 500 ml   Net -500 ml       Physical Exam  Vitals and nursing note reviewed.   Cardiovascular:      Rate and Rhythm: Normal rate and regular rhythm.   Pulmonary:      Effort: No respiratory distress.      Breath sounds: Normal breath sounds.   Abdominal:      General: Bowel sounds are normal.      Palpations: Abdomen is soft.   Neurological:      Mental Status: She is alert.           Results Review:       I reviewed the patient's new clinical results.  Results from last 7 days   Lab Units 22  0526 22  0513   WBC 10*3/mm3 6.59 7.21   HEMOGLOBIN g/dL 12.4 12.5   PLATELETS 10*3/mm3 247 229     Results from last 7 days   Lab Units 22  0526 22  0513    SODIUM mmol/L 137 137   POTASSIUM mmol/L 4.0 4.1   CHLORIDE mmol/L 106 102   CO2 mmol/L 23.5 22.9   BUN mg/dL 7* 13   CREATININE mg/dL 0.75 1.03*   CALCIUM mg/dL 9.0 10.0   Estimated Creatinine Clearance: 54.9 mL/min (by C-G formula based on SCr of 0.75 mg/dL).      Assessment & Plan   Active Hospital Problems    Diagnosis  POA   • Elevated blood pressure reading [R03.0]  Unknown   • Altered mental status, unspecified altered mental status type [R41.82]  Yes   • Contusion of left lower extremity [S80.12XA]  Yes   • Dehydration [E86.0]  Yes   • Acquired hypothyroidism [E03.9]  Yes   • Moderate persistent asthma without complication [J45.40]  Yes   • Mixed hyperlipidemia [E78.2]  Yes      Resolved Hospital Problems   No resolved problems to display.       PLAN  This is an 82-year-old lady with a history of hypothyroidism, hyperlipidemia, diagnosis of dementia who presents with falls and confusion from home.  -CT and MRI of the head were negative.  -Multiple x-rays evaluated during this hospitalization and possible fracture noted within the dorsal aspect of the radius.  Orthopedic surgery did evaluate for this issue and recommends nonoperative management.  Brace has been placed to her left wrist and instructed to be essentially nonweightbearing with that hand she is encouraged to wiggle her fingers and move her elbow and shoulder.  It is okay to remove the brace to shower but should be otherwise normal.  She is to follow-up with orthopedic surgery in the office in 2 weeks  -Continue vitamin D supplementation  -Blood pressure has been elevated and started on lisinopril twice daily and is tolerating this.  Could need further adjustment in the outpatient setting once she is back into her home situation.  We will need to monitor for hypotension as well.  -At this point is no contraindication to discharge to surgery.    Rafi Seo MD  [facility]  09/03/22  08:23 EDT

## 2022-09-03 NOTE — NURSING NOTE
This nurse faxed discharge summary to [facility] and gave report at this time. Daughter will be providing transportation and is at bedside awaiting discharge to be complete.

## 2022-09-03 NOTE — CONSULTS
Orthopedic Consult      Patient: Rut Wright    Date of Admission: 8/30/2022  4:44 AM    YOB: 1940    Medical Record Number: [id number]    Consulting Physician: Rafi Seo MD    Chief Complaints: Left wrist pain    History of Present Illness: 82 y.o. female admitted to [facility] to services of Rafi Seo MD with altered mental status several reported falls.  Patient was getting ready to be discharged and start complaining of some wrist pain that was new for members bedside.  X-rays were taken there is concern for distal radius fracture orthopedics was consulted.  Patient seen exam this morning.  She already has a wrist brace on reports that his been feeling fine just some pain with certain movements overall she is doing okay.  No other complaints.    Allergies:   Allergies   Allergen Reactions   • Penicillins Hives       Home Medications:    Current Facility-Administered Medications:   •  acetaminophen (TYLENOL) tablet 650 mg, 650 mg, Oral, Q4H PRN, 650 mg at 09/02/22 2025 **OR** acetaminophen (TYLENOL) 160 MG/5ML solution 650 mg, 650 mg, Oral, Q4H PRN **OR** acetaminophen (TYLENOL) suppository 650 mg, 650 mg, Rectal, Q4H PRN, Tila Peterson, APRN  •  anastrozole (ARIMIDEX) tablet 1 mg, 1 mg, Oral, Daily, Usman Dang MD, 1 mg at 09/02/22 1009  •  atorvastatin (LIPITOR) tablet 10 mg, 10 mg, Oral, Nightly, Usman Dang MD, 10 mg at 09/02/22 2021  •  budesonide-formoterol (SYMBICORT) 80-4.5 MCG/ACT inhaler 2 puff, 2 puff, Inhalation, BID - RT, Usman Dang MD, 2 puff at 09/03/22 0826  •  buPROPion XL (WELLBUTRIN XL) 24 hr tablet 300 mg, 300 mg, Oral, Daily, Usman Dang MD, 300 mg at 09/02/22 1009  •  cholecalciferol (VITAMIN D3) tablet 2,000 Units, 2,000 Units, Oral, Daily, Usman Dang MD, 2,000 Units at 09/02/22 1009  •  levothyroxine (SYNTHROID, LEVOTHROID) tablet 50 mcg, 50 mcg, Oral, Daily,  Usman Dang MD, 50 mcg at 09/02/22 1008  •  lisinopril (PRINIVIL,ZESTRIL) tablet 5 mg, 5 mg, Oral, Q12H, Yanely Cohn APRN, 5 mg at 09/02/22 2021  •  meclizine (ANTIVERT) tablet 25 mg, 25 mg, Oral, TID PRN, Usman Dang MD  •  melatonin tablet 5 mg, 5 mg, Oral, Nightly PRN, Usman Dang MD  •  Mirabegron ER (MYRBETRIQ) 24 hr tablet 50 mg, 50 mg, Oral, Daily, Usman Dang MD, 50 mg at 09/02/22 1010  •  montelukast (SINGULAIR) tablet 10 mg, 10 mg, Oral, Nightly, Usman Dang MD, 10 mg at 09/02/22 2022  •  nitroglycerin (NITROSTAT) SL tablet 0.4 mg, 0.4 mg, Sublingual, Q5 Min PRN, Tila Peterson APRN  •  ondansetron (ZOFRAN) injection 4 mg, 4 mg, Intravenous, Q6H PRN, Tila Peterson APRN, 4 mg at 09/01/22 0145  •  ondansetron (ZOFRAN) tablet 4 mg, 4 mg, Oral, Q6H PRN, Cristy Dawson APRN, 4 mg at 09/02/22 1050  •  [COMPLETED] Insert peripheral IV, , , Once **AND** sodium chloride 0.9 % flush 10 mL, 10 mL, Intravenous, PRN, Alonzo Cohn MD  •  sodium chloride 0.9 % flush 10 mL, 10 mL, Intravenous, Q12H, Tila Peterson APRN, 10 mL at 08/31/22 2124  •  sodium chloride 0.9 % flush 10 mL, 10 mL, Intravenous, PRN, Tila Peterson APRN  •  venlafaxine XR (EFFEXOR-XR) 24 hr capsule 150 mg, 150 mg, Oral, Daily, Usman Dang MD, 150 mg at 09/02/22 1009  •  vitamin D (ERGOCALCIFEROL) capsule 50,000 Units, 50,000 Units, Oral, Once, Berto Galindo MD    Current Medications:  Scheduled Meds:anastrozole, 1 mg, Oral, Daily  atorvastatin, 10 mg, Oral, Nightly  budesonide-formoterol, 2 puff, Inhalation, BID - RT  buPROPion XL, 300 mg, Oral, Daily  cholecalciferol, 2,000 Units, Oral, Daily  levothyroxine, 50 mcg, Oral, Daily  lisinopril, 5 mg, Oral, Q12H  Mirabegron ER, 50 mg, Oral, Daily  montelukast, 10 mg, Oral, Nightly  sodium chloride, 10 mL, Intravenous, Q12H  venlafaxine XR, 150 mg, Oral,  Daily  vitamin D, 50,000 Units, Oral, Once      Continuous Infusions:   PRN Meds:.•  acetaminophen **OR** acetaminophen **OR** acetaminophen  •  meclizine  •  melatonin  •  nitroglycerin  •  ondansetron  •  ondansetron  •  [COMPLETED] Insert peripheral IV **AND** sodium chloride  •  sodium chloride    Past Medical History:   Diagnosis Date   • Allergic    • Cancer (HCC)     Left breast   • Depression    • Hyperlipidemia    • Hypertension        Past Surgical History:   Procedure Laterality Date   • COLONOSCOPY N/A 01/20/2017    Procedure: COLONOSCOPY;  Surgeon: Araceli Miranda MD;  Location: [facility] OR;  Service:    • ENDOSCOPY N/A 01/20/2017    Procedure: ESOPHAGOGASTRODUODENOSCOPY WITH BIOPSY;  Surgeon: Araceli Miranda MD;  Location: [facility] OR;  Service:    • HYSTERECTOMY     • MASTECTOMY Left    • TONSILLECTOMY         Social History     Occupational History   • Not on file   Tobacco Use   • Smoking status: Never Smoker   • Smokeless tobacco: Never Used   Vaping Use   • Vaping Use: Never used   Substance and Sexual Activity   • Alcohol use: No   • Drug use: No   • Sexual activity: Defer      Social History     Social History Narrative   • Not on file       Family History   Problem Relation Age of Onset   • Heart disease Mother    • Heart disease Father        Review of Systems:     Constitutional:  Denies fever, shaking or chills   Eyes:  Denies change in visual acuity   HEENT:  Denies nasal congestion or sore throat   Respiratory:  Denies cough or shortness of breath   Cardiovascular:  Denies chest pain or edema  Endocrine: Denies tremors, palpitations, intolerance of heat or cold, polyuria, polydipsia.  GI:  Denies abdominal pain, nausea, vomiting, bloody stools or diarrhea  :  Denies frequency, urgency, incontinence, retention, or nocturia.  Musculoskeletal:  Denies numbness tingling or loss of motor function except as above  Integument:  Denies rash, lesion or ulceration   Neurologic:  Denies headache or focal  weakness, deficits  Heme:  Denies epistaxis, spontaneous or excessive bleeding, hematuria, melena, fatigue, enlarged or tender lymph nodes.      All other pertinent positives and negatives as noted above in HPI.    Physical Exam: 82 y.o. female    Vitals:    09/02/22 2012 09/03/22 0247 09/03/22 0625 09/03/22 0826   BP: 162/84 169/79 161/72    BP Location: Right arm Right arm Right arm    Patient Position: Lying Lying Lying    Pulse: 82 81 78 79   Resp: 16 16 16 16   Temp: 97.5 °F (36.4 °C) 98.1 °F (36.7 °C) 97.2 °F (36.2 °C)    TempSrc: Oral Oral Oral    SpO2:  98% 98% 95%   Weight:       Height:         General:  Awake, alert. No acute distress.          Extremities: Left upper extremity:  Skin appears benign without obvious lacerations, ulcerations or lesions.  No gross deformity of malalignment noted.  Compartments soft without evidence for DVT or compartment syndrome.  No atrophy.  No palpable masses or adenopathy.  Focal tenderness noted over distal radius.  ROM limited due to discomfort.   No obvious instability although exam is limited due to discomfort.  Strength well-preserved distally.  Sensation to light touch grossly intact distally.  Good skin turgor, brisk cap refill and good pulses distally.    All other extremities atraumatic without gross abnormality.     Diagnostic Tests:    Admission on 08/30/2022   Component Date Value Ref Range Status   • Glucose 08/30/2022 90  65 - 99 mg/dL Final   • BUN 08/30/2022 13  8 - 23 mg/dL Final   • Creatinine 08/30/2022 1.03 (A) 0.57 - 1.00 mg/dL Final   • Sodium 08/30/2022 137  136 - 145 mmol/L Final   • Potassium 08/30/2022 4.1  3.5 - 5.2 mmol/L Final   • Chloride 08/30/2022 102  98 - 107 mmol/L Final   • CO2 08/30/2022 22.9  22.0 - 29.0 mmol/L Final   • Calcium 08/30/2022 10.0  8.6 - 10.5 mg/dL Final   • Total Protein 08/30/2022 6.3  6.0 - 8.5 g/dL Final   • Albumin 08/30/2022 4.30  3.50 - 5.20 g/dL Final   • ALT (SGPT) 08/30/2022 11  1 - 33 U/L Final   • AST  (SGOT) 08/30/2022 14  1 - 32 U/L Final   • Alkaline Phosphatase 08/30/2022 96  39 - 117 U/L Final   • Total Bilirubin 08/30/2022 0.6  0.0 - 1.2 mg/dL Final   • Globulin 08/30/2022 2.0  gm/dL Final   • A/G Ratio 08/30/2022 2.2  g/dL Final   • BUN/Creatinine Ratio 08/30/2022 12.6  7.0 - 25.0 Final   • Anion Gap 08/30/2022 12.1  5.0 - 15.0 mmol/L Final   • eGFR 08/30/2022 54.7 (A) >60.0 mL/min/1.73 Final    National Kidney Foundation and American Society of Nephrology (ASN) Task Force recommended calculation based on the Chronic Kidney Disease Epidemiology Collaboration (CKD-EPI) equation refit without adjustment for race.   • Protime 08/30/2022 15.5 (A) 11.7 - 14.2 Seconds Final   • INR 08/30/2022 1.25 (A) 0.90 - 1.10 Final   • PTT 08/30/2022 23.1  22.7 - 35.4 seconds Final   • Color, UA 08/30/2022 Yellow  Yellow, Straw Final   • Appearance, UA 08/30/2022 Clear  Clear Final   • pH, UA 08/30/2022 6.0  5.0 - 8.0 Final   • Specific Gravity, UA 08/30/2022 1.012  1.005 - 1.030 Final   • Glucose, UA 08/30/2022 Negative  Negative Final   • Ketones, UA 08/30/2022 Trace (A) Negative Final   • Bilirubin, UA 08/30/2022 Negative  Negative Final   • Blood, UA 08/30/2022 Negative  Negative Final   • Protein, UA 08/30/2022 Negative  Negative Final   • Leuk Esterase, UA 08/30/2022 Negative  Negative Final   • Nitrite, UA 08/30/2022 Negative  Negative Final   • Urobilinogen, UA 08/30/2022 0.2 E.U./dL  0.2 - 1.0 E.U./dL Final   • Troponin T 08/30/2022 <0.010  0.000 - 0.030 ng/mL Final   • WBC 08/30/2022 7.21  3.40 - 10.80 10*3/mm3 Final   • RBC 08/30/2022 4.32  3.77 - 5.28 10*6/mm3 Final   • Hemoglobin 08/30/2022 12.5  12.0 - 15.9 g/dL Final   • Hematocrit 08/30/2022 38.4  34.0 - 46.6 % Final   • MCV 08/30/2022 88.9  79.0 - 97.0 fL Final   • MCH 08/30/2022 28.9  26.6 - 33.0 pg Final   • MCHC 08/30/2022 32.6  31.5 - 35.7 g/dL Final   • RDW 08/30/2022 13.1  12.3 - 15.4 % Final   • RDW-SD 08/30/2022 42.2  37.0 - 54.0 fl Final   • MPV  08/30/2022 9.3  6.0 - 12.0 fL Final   • Platelets 08/30/2022 229  140 - 450 10*3/mm3 Final   • Neutrophil % 08/30/2022 66.4  42.7 - 76.0 % Final   • Lymphocyte % 08/30/2022 19.1 (A) 19.6 - 45.3 % Final   • Monocyte % 08/30/2022 9.7  5.0 - 12.0 % Final   • Eosinophil % 08/30/2022 3.9  0.3 - 6.2 % Final   • Basophil % 08/30/2022 0.6  0.0 - 1.5 % Final   • Immature Grans % 08/30/2022 0.3  0.0 - 0.5 % Final   • Neutrophils, Absolute 08/30/2022 4.79  1.70 - 7.00 10*3/mm3 Final   • Lymphocytes, Absolute 08/30/2022 1.38  0.70 - 3.10 10*3/mm3 Final   • Monocytes, Absolute 08/30/2022 0.70  0.10 - 0.90 10*3/mm3 Final   • Eosinophils, Absolute 08/30/2022 0.28  0.00 - 0.40 10*3/mm3 Final   • Basophils, Absolute 08/30/2022 0.04  0.00 - 0.20 10*3/mm3 Final   • Immature Grans, Absolute 08/30/2022 0.02  0.00 - 0.05 10*3/mm3 Final   • nRBC 08/30/2022 0.0  0.0 - 0.2 /100 WBC Final   • TSH 08/30/2022 3.520  0.270 - 4.200 uIU/mL Final   • Creatine Kinase 08/30/2022 129  20 - 180 U/L Final   • Vitamin B-12 08/31/2022 448  211 - 946 pg/mL Final   • Folate 08/31/2022 12.10  4.78 - 24.20 ng/mL Final   • WBC 08/31/2022 6.59  3.40 - 10.80 10*3/mm3 Final   • RBC 08/31/2022 4.29  3.77 - 5.28 10*6/mm3 Final   • Hemoglobin 08/31/2022 12.4  12.0 - 15.9 g/dL Final   • Hematocrit 08/31/2022 37.4  34.0 - 46.6 % Final   • MCV 08/31/2022 87.2  79.0 - 97.0 fL Final   • MCH 08/31/2022 28.9  26.6 - 33.0 pg Final   • MCHC 08/31/2022 33.2  31.5 - 35.7 g/dL Final   • RDW 08/31/2022 13.2  12.3 - 15.4 % Final   • RDW-SD 08/31/2022 40.9  37.0 - 54.0 fl Final   • MPV 08/31/2022 9.3  6.0 - 12.0 fL Final   • Platelets 08/31/2022 247  140 - 450 10*3/mm3 Final   • Glucose 08/31/2022 126 (A) 65 - 99 mg/dL Final   • BUN 08/31/2022 7 (A) 8 - 23 mg/dL Final   • Creatinine 08/31/2022 0.75  0.57 - 1.00 mg/dL Final   • Sodium 08/31/2022 137  136 - 145 mmol/L Final   • Potassium 08/31/2022 4.0  3.5 - 5.2 mmol/L Final    Slight hemolysis detected by analyzer. Results may be  affected.   • Chloride 08/31/2022 106  98 - 107 mmol/L Final   • CO2 08/31/2022 23.5  22.0 - 29.0 mmol/L Final   • Calcium 08/31/2022 9.0  8.6 - 10.5 mg/dL Final   • BUN/Creatinine Ratio 08/31/2022 9.3  7.0 - 25.0 Final   • Anion Gap 08/31/2022 7.5  5.0 - 15.0 mmol/L Final   • eGFR 08/31/2022 80.1  >60.0 mL/min/1.73 Final    National Kidney Foundation and American Society of Nephrology (ASN) Task Force recommended calculation based on the Chronic Kidney Disease Epidemiology Collaboration (CKD-EPI) equation refit without adjustment for race.   • 25 Hydroxy, Vitamin D 08/31/2022 47.0  30.0 - 100.0 ng/ml Final     Lab Results (last 24 hours)     ** No results found for the last 24 hours. **          Imaging: Left wrist films were taken and reviewed do show distal radius fracture that does extend into the joint with about 1 to 2 mm of depression.  No significant displacement on the lateral view.    Assessment: Left distal radius fracture    Plan: Plan for conservative treatment at this time.  Brace has been placed to the patient's left wrist.  She is instructed to be essentially nonweightbearing but can lift no more than 1 pound with the brace on.  Encouraged her to wiggle her fingers and move her elbow and shoulder.  Okay to remove the brace only to shower but then put it back on as it should be on full-time.  Continue her vitamin D.  I did give her a one-time high-dose vitamin D to help with bone healing.  She is on vitamin D 2000 IUs daily she should continue that for the next couple months.  Told her that bones can take 6 to 8 weeks to heal I like her to follow-up in 2 weeks with me in the office.  Till then pain control per medicine team try to avoid anti-inflammatories due to possible inhibitory effects on bone healing.  Okay to ice and elevate as needed for any pain or swelling.    Please call any questions or concerns thank you    Date: 9/3/2022    Berto Galindo MD    CC: Rafi Seo MD

## 2022-09-06 NOTE — PROGRESS NOTES
Case Management Discharge Note      Final Note: Discharged [facility]. Linda Sargent, LATOYA    Provided Post Acute Provider List?: Yes  Delivered To: Support Person  Support Person: Annette chang  Method of Delivery: In person    Selected Continued Care - Discharged on 9/3/2022 Admission date: 8/30/2022 - Discharge disposition: Skilled Nursing Facility (DC - External)    Destination Coordination complete.    Service Provider Selected Services Address Phone Fax Patient Preferred    [facility] [address] [address] [phone] --       Internal Comment last updated by Carol Alves RN 8/31/2022 1301    .13:01 EDT 8/31 [facility] will start pre-cert .Carol Alves RN  8/31 Cristiano no longer works for [facility]. Referral sent to [facility]; CCP will f/u on bed availability and pre-cert. .Carol Alves RN  8/30 Call placed to Jessica jackson/[facility] regarding referral. No answer, left message. Shivani Bañuelos RN                              Transportation Services  Private: Car    Final Discharge Disposition Code: 03 - skilled nursing facility (SNF)

## 2022-09-20 NOTE — TELEPHONE ENCOUNTER
Caller: Jennifer Langstone    Relationship: Emergency Contact    Best call back number: [phone]    Requested Prescriptions:   Requested Prescriptions     Pending Prescriptions Disp Refills   • ezetimibe-simvastatin (VYTORIN) 10-20 MG per tablet 90 tablet 1     Sig: Take 1 tablet by mouth Every Night.        Pharmacy where request should be sent: [facility] - [facility] [place] - [address] [facility] [address] - [phone]  - [phone] FX     Additional details provided by patient: PATIENT WAS AT [facility] FOR PHYSICAL THERAPY.  WHILE PATIENT WAS THERE, DAUGHTER STATES SHE WAS TAKING EZETIMIBE 10 MG AND SIMVASTATIN 20 MG SEPARATELY.  PATIENT RETURNED HOME 09/16/22.    PATIENT CURRENTLY HAS LESS THAN 3 DAY SUPPLY OF SIMVASTATIN AND 11 DAY SUPPLY OF EZETIMIBE TABLETS.    Does the patient have less than a 3 day supply:  [x] Yes  [] No    Popeye Wilde Rep   09/20/22 13:08 EDT

## 2022-09-22 NOTE — TELEPHONE ENCOUNTER
Dr. Galindo  Please call Nestor with [employer] to give weight bearing status with walker.  Please call [phone]

## 2022-09-22 NOTE — TELEPHONE ENCOUNTER
Malena from [facility] calling to get WB status on pt.    Per Dr Richardson's consult note, pt is to be essentially non-WB and to lift no more than 1 pound with the brace on.  She is to be encouraged to wiggle her fingers and move her elbow & shoulder.  OK to remove brace for shower but other than that it should remain on full-time.     Malena was informed of the above and voiced understanding.

## 2022-10-04 PROBLEM — R03.0 ELEVATED BLOOD PRESSURE READING: Status: RESOLVED | Noted: 2022-01-01 | Resolved: 2022-01-01

## 2022-10-04 PROBLEM — I10 ESSENTIAL HYPERTENSION: Status: ACTIVE | Noted: 2022-01-01

## 2022-10-05 NOTE — PROGRESS NOTES
Subjective   Rut Wright is a 82 y.o. female with   Chief Complaint   Patient presents with   • Hospital Follow Up Visit     Discuss possible need for excedrin migraine due to headache in am and memory issues.  Wanting to start Aricept   .    History of Present Illness   82-year-old white female with multiple medical issues here for further medical management.  Patient has been recently hospitalized secondary to severe cognitive impairment and following a fall.  Patient has begun to fall frequently and and this last fall actually fractured her left wrist.  Left wrist is in a splint and she will follow-up with orthopedics.  She and her  live independently in [facility].  She has 1 daughter who lives in [place] and another daughter who lives in [facility].  Her daughter who lives in [facility] is present at this visit and states that as of tomorrow patient will be admitted to a skilled nursing facility in the [facility] area.  She was hoping to acquire a PPD at this visit although has now found out that this is not possible.  She was also requesting a neurology referral based on patient's frequent falling and dementia.  She however wants to have this done in the [facility] and does not have a name for this referral.  She very much is interested in having patient started on Aricept in an effort to see if memory can improve.  Patient's  however will still live independently in [place].  Patient herself is in a good mood and demeanor is pleasant.  She however has very little memory of events that occurred and surrounding the recent hospitalization.  The following portions of the patient's history were reviewed and updated as appropriate: allergies, current medications, past family history, past medical history, past social history, past surgical history and problem list.    Review of Systems   Respiratory:        Mild intermittent uncomplicated asthma   Cardiovascular:         Hyperlipidemia, hypertension   Endocrine:        Vitamin D deficiency, hypothyroidism   Genitourinary:        Overactive bladder   Musculoskeletal: Positive for arthralgias.        Fracture left wrist   Allergic/Immunologic: Positive for environmental allergies.   Neurological: Positive for headaches.        Dementia, frequent falling   Psychiatric/Behavioral: Positive for dysphoric mood. The patient is nervous/anxious.        Objective     Vitals:    10/04/22 1336   BP: 158/82   Pulse: 91   Temp: 97.3 °F (36.3 °C)   SpO2: 96%       No results found for this or any previous visit (from the past 672 hour(s)).    Physical Exam  Vitals and nursing note reviewed.   Constitutional:       Appearance: Normal appearance. She is well-developed and well-groomed.   HENT:      Head: Normocephalic and atraumatic.   Neck:      Thyroid: No thyroid mass or thyromegaly.      Vascular: Normal carotid pulses. No carotid bruit.      Trachea: Trachea and phonation normal.   Cardiovascular:      Rate and Rhythm: Normal rate and regular rhythm.      Heart sounds: Normal heart sounds. No murmur heard.    No friction rub. No gallop.   Pulmonary:      Effort: Pulmonary effort is normal. No respiratory distress.      Breath sounds: Normal breath sounds. No decreased breath sounds, wheezing, rhonchi or rales.   Musculoskeletal:      Cervical back: Neck supple.   Lymphadenopathy:      Cervical: No cervical adenopathy.   Skin:     General: Skin is warm and dry.      Findings: No rash.   Neurological:      Mental Status: She is alert and oriented to person, place, and time.   Psychiatric:         Attention and Perception: Attention and perception normal.         Mood and Affect: Mood and affect normal.         Speech: Speech normal.         Behavior: Behavior normal. Behavior is cooperative.         Thought Content: Thought content normal.         Cognition and Memory: Cognition normal. Memory is impaired. She exhibits impaired recent  memory.         Judgment: Judgment normal.         Assessment & Plan   Diagnoses and all orders for this visit:    1. Falling episodes (Primary)    2. Memory changes  -     donepezil (Aricept) 5 MG tablet; Take 1 tablet by mouth Every Night.  Dispense: 30 tablet; Refill: 0    3. Nonintractable headache, unspecified chronicity pattern, unspecified headache type    4. Essential hypertension  -     lisinopril (PRINIVIL,ZESTRIL) 20 MG tablet; Take 1 tablet by mouth Daily.  Dispense: 90 tablet; Refill: 0    5. Acquired hypothyroidism    6. Vitamin D deficiency    7. Moderate persistent asthma without complication    20 minutes of a 30-minute appointment spent counseling in regards to dementia and types thereof.  Much of this counseling spent in discussing treatments and regimens to employ.  Have expressed desire that patient's daughter find a neurologist in the [place] area that she can be referred to.  She apparently will be admitted tomorrow to a skilled nursing facility in [place] and they will handle a PPD at that point.  Medications will be assumed by in-house MD.  Further advancement of memory medications will take place at the skilled nursing facility.    Return if symptoms worsen or fail to improve.

## 2022-12-13 ENCOUNTER — TELEPHONE (OUTPATIENT)
Dept: FAMILY MEDICINE CLINIC | Facility: CLINIC | Age: 82
End: 2022-12-13

## 2022-12-13 NOTE — TELEPHONE ENCOUNTER
"“Please be informed that patient has passed. Patient has been marked  in the system. The date of death is: 12/10/2022\".    Caller: Jose Armando Wright    Relationship: Emergency Contact    Best call back number: [phone]    PATIENT'S  ASKS THAT W[facility]ON OR DR CASILLAS CALL HIM BACK ASAP    "

## 2022-12-13 NOTE — TELEPHONE ENCOUNTER
Jose Armando Wright calling to make sure you knew she passed on Saturday morning.  They were  56 years.  There will be a celebration of life service this Saturday at [facility] in [place] at 2:00pm

## (undated) DEVICE — SUCTION CANISTER, 3000CC,SAFELINER: Brand: DEROYAL

## (undated) DEVICE — SYR LUER SLPTP 50ML

## (undated) DEVICE — SYR LL 3CC

## (undated) DEVICE — SPNG GZ WOVN 4X4IN 12PLY 10/BX STRL

## (undated) DEVICE — MASK,FACE,SHIELD,BLUE,ANTI FOG,TIES: Brand: MEDLINE

## (undated) DEVICE — FRCP BX RADJAW4 NDL 2.8 240CM LG OG BX40

## (undated) DEVICE — BW-412T DISP COMBO CLEANING BRUSH: Brand: SINGLE USE COMBINATION CLEANING BRUSH

## (undated) DEVICE — THE BITE BLOCK MAXI, LATEX FREE STRAP IS USED TO PROTECT THE ENDOSCOPE INSERTION TUBE FROM BEING BITTEN BY THE PATIENT.

## (undated) DEVICE — JACKT LAB KNIT COLR LG BLU

## (undated) DEVICE — VIAL FORMALIN CAP 10P 40ML

## (undated) DEVICE — GLV SURG SENSICARE MICRO PF LF 6 STRL

## (undated) DEVICE — GOWN ISOL W/THUMB UNIV BLU BX/15

## (undated) DEVICE — Device

## (undated) DEVICE — Device: Brand: DEFENDO AIR/WATER/SUCTION AND BIOPSY VALVE

## (undated) DEVICE — ENDOGATOR AUXILIARY WATER JET CONNECTOR: Brand: ENDOGATOR